# Patient Record
Sex: FEMALE | Race: WHITE | NOT HISPANIC OR LATINO | Employment: OTHER | URBAN - METROPOLITAN AREA
[De-identification: names, ages, dates, MRNs, and addresses within clinical notes are randomized per-mention and may not be internally consistent; named-entity substitution may affect disease eponyms.]

---

## 2023-10-09 PROBLEM — J45.909 ASTHMA (HHS-HCC): Status: ACTIVE | Noted: 2023-10-09

## 2023-10-09 PROBLEM — M81.0 OSTEOPOROSIS OF VERTEBRA: Status: ACTIVE | Noted: 2023-10-09

## 2023-10-09 PROBLEM — M48.02 CERVICAL STENOSIS OF SPINE: Status: ACTIVE | Noted: 2023-10-09

## 2023-10-09 PROBLEM — M47.812 SPONDYLOSIS OF CERVICAL REGION WITHOUT MYELOPATHY OR RADICULOPATHY: Status: ACTIVE | Noted: 2023-10-09

## 2023-10-09 PROBLEM — M51.369 LUMBAR DEGENERATIVE DISC DISEASE: Status: ACTIVE | Noted: 2023-10-09

## 2023-10-09 PROBLEM — M50.20 HERNIATION OF INTERVERTEBRAL DISC OF CERVICAL REGION: Status: ACTIVE | Noted: 2023-10-09

## 2023-10-09 PROBLEM — M54.50 LOW BACK PAIN: Status: ACTIVE | Noted: 2023-10-09

## 2023-10-09 PROBLEM — M51.36 LUMBAR DEGENERATIVE DISC DISEASE: Status: ACTIVE | Noted: 2023-10-09

## 2023-10-09 PROBLEM — M54.40 LUMBAGO WITH SCIATICA: Status: ACTIVE | Noted: 2023-10-09

## 2023-10-09 PROBLEM — M48.061 LUMBAR CANAL STENOSIS: Status: ACTIVE | Noted: 2023-10-09

## 2023-10-09 PROBLEM — M54.2 CERVICAL PAIN: Status: ACTIVE | Noted: 2023-10-09

## 2023-10-09 PROBLEM — M54.17 LUMBOSACRAL NEURITIS: Status: ACTIVE | Noted: 2023-10-09

## 2023-10-09 PROBLEM — S32.040K: Status: ACTIVE | Noted: 2023-10-09

## 2023-10-09 PROBLEM — G56.00 CARPAL TUNNEL SYNDROME: Status: ACTIVE | Noted: 2023-10-09

## 2023-10-09 PROBLEM — M54.12 CERVICAL RADICULOPATHY: Status: ACTIVE | Noted: 2023-10-09

## 2023-10-09 PROBLEM — M47.816 LUMBAR ARTHROPATHY: Status: ACTIVE | Noted: 2023-10-09

## 2023-10-09 RX ORDER — INSULIN ASPART 100 [IU]/ML
INJECTION, SOLUTION INTRAVENOUS; SUBCUTANEOUS
COMMUNITY

## 2023-10-09 RX ORDER — MAGNESIUM OXIDE 240 MG
POWDER IN PACKET (EA) ORAL
COMMUNITY

## 2023-10-09 RX ORDER — GABAPENTIN 600 MG/1
1200 TABLET ORAL 3 TIMES DAILY
COMMUNITY
Start: 2014-02-05

## 2023-10-09 RX ORDER — METHADONE HYDROCHLORIDE 5 MG/1
1 TABLET ORAL 2 TIMES DAILY
COMMUNITY
Start: 2016-07-01

## 2023-10-09 RX ORDER — CHOLECALCIFEROL (VITAMIN D3)
CRYSTALS MISCELLANEOUS
COMMUNITY

## 2023-10-09 RX ORDER — INSULIN GLARGINE 100 [IU]/ML
INJECTION, SOLUTION SUBCUTANEOUS
COMMUNITY
Start: 2013-10-07

## 2023-10-09 RX ORDER — OXYCODONE AND ACETAMINOPHEN 5; 325 MG/1; MG/1
1 TABLET ORAL EVERY 6 HOURS PRN
COMMUNITY

## 2023-10-09 RX ORDER — DULOXETIN HYDROCHLORIDE 60 MG/1
60 CAPSULE, DELAYED RELEASE ORAL
COMMUNITY

## 2023-10-09 RX ORDER — TIZANIDINE HYDROCHLORIDE 4 MG/1
1 CAPSULE, GELATIN COATED ORAL NIGHTLY
COMMUNITY

## 2023-10-09 RX ORDER — ALBUTEROL SULFATE 0.83 MG/ML
SOLUTION RESPIRATORY (INHALATION)
COMMUNITY

## 2023-10-09 RX ORDER — METOPROLOL TARTRATE 25 MG/1
1 TABLET, FILM COATED ORAL DAILY
COMMUNITY
Start: 2013-04-01

## 2023-10-09 RX ORDER — POLYETHYLENE GLYCOL 3350 17 G/17G
POWDER, FOR SOLUTION ORAL
COMMUNITY

## 2023-10-09 RX ORDER — INSULIN LISPRO 100 [IU]/ML
INJECTION, SOLUTION INTRAVENOUS; SUBCUTANEOUS
COMMUNITY
Start: 2013-06-22

## 2023-10-09 RX ORDER — SUCRALFATE 1 G/1
1 TABLET ORAL DAILY
COMMUNITY

## 2023-10-09 RX ORDER — UBIDECARENONE 75 MG
500 CAPSULE ORAL DAILY
COMMUNITY

## 2023-10-09 RX ORDER — BISACODYL 5 MG
1 TABLET, DELAYED RELEASE (ENTERIC COATED) ORAL 2 TIMES DAILY
COMMUNITY

## 2023-10-09 RX ORDER — CELECOXIB 200 MG/1
CAPSULE ORAL
COMMUNITY

## 2023-10-09 RX ORDER — NAPROXEN SODIUM 220 MG/1
TABLET, FILM COATED ORAL
COMMUNITY

## 2023-10-09 RX ORDER — MICONAZOLE NITRATE
POWDER (GRAM) MISCELLANEOUS
COMMUNITY

## 2023-10-09 RX ORDER — FUROSEMIDE 40 MG/1
1 TABLET ORAL DAILY
COMMUNITY

## 2023-10-09 RX ORDER — OMEPRAZOLE 20 MG/1
1 CAPSULE, DELAYED RELEASE ORAL 2 TIMES DAILY
COMMUNITY

## 2023-10-09 RX ORDER — ATORVASTATIN CALCIUM 20 MG/1
1 TABLET, FILM COATED ORAL NIGHTLY
COMMUNITY

## 2023-10-09 RX ORDER — PREGABALIN 150 MG/1
1 CAPSULE ORAL 3 TIMES DAILY
COMMUNITY
Start: 2020-05-14

## 2023-10-09 RX ORDER — MOMETASONE FUROATE AND FORMOTEROL FUMARATE DIHYDRATE 100; 5 UG/1; UG/1
AEROSOL RESPIRATORY (INHALATION)
COMMUNITY

## 2023-10-10 ENCOUNTER — APPOINTMENT (OUTPATIENT)
Dept: PAIN MEDICINE | Facility: CLINIC | Age: 63
End: 2023-10-10
Payer: MEDICAID

## 2023-12-11 ENCOUNTER — HOSPITAL ENCOUNTER (OUTPATIENT)
Dept: RESPIRATORY THERAPY | Facility: HOSPITAL | Age: 63
Discharge: HOME | End: 2023-12-11
Payer: MEDICAID

## 2023-12-11 DIAGNOSIS — J44.9 CHRONIC OBSTRUCTIVE PULMONARY DISEASE, UNSPECIFIED COPD TYPE (MULTI): ICD-10-CM

## 2023-12-11 PROCEDURE — 94729 DIFFUSING CAPACITY: CPT

## 2023-12-13 LAB
MGC ASCENT PFT - FEV1 - POST: 1.54
MGC ASCENT PFT - FEV1 - PRE: 1.39
MGC ASCENT PFT - FEV1 - PREDICTED: 2.28
MGC ASCENT PFT - FVC - POST: 1.95
MGC ASCENT PFT - FVC - PRE: 1.79
MGC ASCENT PFT - FVC - PREDICTED: 2.88

## 2024-02-01 ENCOUNTER — APPOINTMENT (OUTPATIENT)
Dept: RADIOLOGY | Facility: HOSPITAL | Age: 64
End: 2024-02-01
Payer: MEDICAID

## 2024-02-07 ENCOUNTER — HOSPITAL ENCOUNTER (OUTPATIENT)
Dept: RADIOLOGY | Facility: CLINIC | Age: 64
Discharge: HOME | End: 2024-02-07
Payer: MEDICAID

## 2024-02-07 DIAGNOSIS — E04.2 NONTOXIC MULTINODULAR GOITER: ICD-10-CM

## 2024-02-07 DIAGNOSIS — R93.89 ABNORMAL FINDINGS ON DIAGNOSTIC IMAGING OF OTHER SPECIFIED BODY STRUCTURES: ICD-10-CM

## 2024-02-07 PROCEDURE — 76536 US EXAM OF HEAD AND NECK: CPT | Performed by: RADIOLOGY

## 2024-02-07 PROCEDURE — 76536 US EXAM OF HEAD AND NECK: CPT

## 2024-02-20 ENCOUNTER — CLINICAL SUPPORT (OUTPATIENT)
Dept: SLEEP MEDICINE | Facility: CLINIC | Age: 64
End: 2024-02-20
Payer: MEDICAID

## 2024-02-20 DIAGNOSIS — G47.33 OBSTRUCTIVE SLEEP APNEA (ADULT) (PEDIATRIC): ICD-10-CM

## 2024-02-20 PROCEDURE — 95810 POLYSOM 6/> YRS 4/> PARAM: CPT | Performed by: PSYCHIATRY & NEUROLOGY

## 2024-02-21 VITALS
WEIGHT: 279.98 LBS | SYSTOLIC BLOOD PRESSURE: 130 MMHG | HEIGHT: 64 IN | BODY MASS INDEX: 47.8 KG/M2 | DIASTOLIC BLOOD PRESSURE: 61 MMHG

## 2024-02-21 NOTE — PROGRESS NOTES
[facility] TECH NOTE:     Patient: Guillermina Bonilla   MRN//AGE: [id number]  1960  63 y.o.   Technologist: Lara Parekh   Room: 4   Service Date: 2024        Sleep Testing Location: [place]    [place]: not completed on paperwork    TECHNOLOGIST SLEEP STUDY PROCEDURE NOTE:   This sleep study is being conducted according to the policies and procedures outlined by the A[employer] accreditation standards.  The sleep study procedure and processes involved during this appointment was explained to the patient/patient’s family, questions were answered. The patient/family verbalized understanding.      The patient is a 63 y.o. year old female scheduled for aDiagnostic PSG Split night with montage of:  SPLIT . she arrived for her appointment.      The study that was ultimately completed was a Diagnostic PSG  with montage of:  SPLIT .    The full study Was completed.  Patient questionnaires completed?: yes -partially    Consents signed? yes    Initial Fall Risk Screening:     Guillermina has fallen in the last 6 months. her did result in injury. Guillermina does have a fear of falling. He does need assistance with sitting, standing, or walking. she does need assistance walking in her home. she does need assistance in an unfamiliar setting. The patient isusing an assistive device.     Brief Study observations: The patient is a 63 year old female here for a SPLIT study. This is the patient's 3rd study according to patient- never diagnosed with YADIRA. The patient arrived with her  at  and completed SOME paperwork with help. NO EPWORTH completed. Patient arrived with no bed clothes. Patient is incontinent and wears depends- did not bring any with her ( lab provided some). Bedtime and wake time not listed in paperwork. Patient sleeps in side position at home.   Patient is on 4LPM Oxygen . Started study without O2.  Medicaid-4% rule  The sleep study procedure and process involved during this appointment was explained to the patient  and questions were answered. The patient verbalized understanding.     Deviation to order/protocol and reason: none        Other:None    After the procedure, the patient/family was informed to ensure followup with ordering clinician for testing results.      Technologist: Lara Parekh

## 2024-09-10 ENCOUNTER — APPOINTMENT (OUTPATIENT)
Dept: ORTHOPEDIC SURGERY | Facility: CLINIC | Age: 64
End: 2024-09-10
Payer: MEDICARE

## 2024-09-10 VITALS — WEIGHT: 279 LBS | BODY MASS INDEX: 47.63 KG/M2 | HEIGHT: 64 IN

## 2024-09-10 DIAGNOSIS — M47.816 LUMBAR SPONDYLOSIS: Primary | ICD-10-CM

## 2024-09-10 PROCEDURE — 3008F BODY MASS INDEX DOCD: CPT | Performed by: FAMILY MEDICINE

## 2024-09-10 PROCEDURE — 99203 OFFICE O/P NEW LOW 30 MIN: CPT | Performed by: FAMILY MEDICINE

## 2024-09-10 NOTE — PROGRESS NOTES
History of Present Illness   Chief Complaint   Patient presents with    Lower Back - Pain     NKI  +lumbar pain x several years  +sciatica left side  H/O injections and some therapy with pain mgmt         The patient is 63 y.o. female  here with a complaint of chronic right back pain.  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  Patient has had history of chronic back pain, believes last time she saw someone specifically for her back was around 5 years ago, says that she has had injections in the past with minimal benefit, admits to being on higher doses of narcotic pain medication in the past, was eventually weaned off of these meds, she is currently taking Motrin which is of no benefit, says she has tried tramadol in the past which was of no benefit.  Pain across the low back, slightly worse on the left, she denies any significant radiation of pain.  Patient is minimally ambulatory, can walk around her room at her nursing facility but uses a wheelchair for any extended distances, says it hurts her back to stand and ambulate.  She says that her nurse recommended that she follow-up with me for evaluation today.  She has had some recent imaging including CT scan in February of this year that showed moderate degenerative changes of the lumbar spine.  She has tried physical therapy in the past including at her current nursing home but says that pain is too significant to do PT    Past Medical History:   Diagnosis Date    Anesthesia of skin     Numbness    Cervicalgia 02/19/2021    Cervical pain    Difficulty in walking, not elsewhere classified     Difficulty walking    Other symptoms and signs involving the musculoskeletal system     Limb weakness    Pain in unspecified limb     Limb pain    Personal history of other mental and behavioral disorders      History of depression    Personal history of other specified conditions     History of fatigue    Personal history of other specified conditions     History of urinary frequency    Personal history of other specified conditions     History of pain when walking    Spinal stenosis, cervical region 04/25/2017    Cervical stenosis of spine    Spondylosis without myelopathy or radiculopathy, cervical region 04/25/2017    Spondylosis of cervical region without myelopathy or radiculopathy    Xerosis cutis     Dry skin       Medication Documentation Review Audit    **Prior to Admission medications have not yet been reviewed**         Allergies   Allergen Reactions    Azithromycin Nausea Only    Erythromycin Base Nausea Only    Tetracyclines Rash       Social History     Socioeconomic History    Marital status:      Spouse name: Not on file    Number of children: Not on file    Years of education: Not on file    Highest education level: Not on file   Occupational History    Not on file   Tobacco Use    Smoking status: Not on file    Smokeless tobacco: Not on file   Substance and Sexual Activity    Alcohol use: Not Currently    Drug use: Never    Sexual activity: Not on file   Other Topics Concern    Not on file   Social History Narrative    Not on file     Social Determinants of Health     Financial Resource Strain: Low Risk  (5/31/2022)    Received from [facility]    Overall Financial Resource Strain (CARDIA)     Difficulty of Paying Living Expenses: Not hard at all   Food Insecurity: No Food Insecurity (5/31/2022)    Received from [employer]    Hunger Vital Sign     Worried About Running Out of Food in the Last Year: Never true     Ran Out of Food in the Last Year: Never true   Transportation Needs: No Transportation Needs (5/31/2022)    Received from [employer]    PRAPARE - Transportation     Lack of Transportation (Medical): No     Lack of Transportation (Non-Medical): No    Physical Activity: Inactive (2022)    Received from [facility]    Exercise Vital Sign     Days of Exercise per Week: 0 days     Minutes of Exercise per Session: 0 min   Stress: Not on File (2019)    [facility] from [facility]    Stress     Stress: 0   Social Connections: Not on File (2019)    Received from [facility]    Social Connections     Social Connections and Isolation: 0   Intimate Partner Violence: Not on file   Housing Stability: Low Risk  (2022)    Received from [facility]    Housing Stability Vital Sign     Unable to Pay for Housing in the Last Year: No     Number of Places Lived in the Last Year: 1     Unstable Housing in the Last Year: No       Past Surgical History:   Procedure Laterality Date    CERVICAL FUSION  2014    Cervical Vertebral Fusion     SECTION, CLASSIC  2014     Section    OTHER SURGICAL HISTORY  2020    Ankle surgery    OTHER SURGICAL HISTORY  2020    Coronary artery bypass graft          Review of Systems   GENERAL: Negative  GI: Negative  MUSCULOSKELETAL: See HPI  SKIN: Negative  NEURO:  Negative     Physical Exam:    General/Constitutional: well appearing, no distress, appears stated age  HEENT: sclera clear  Respiratory: non labored breathing  Vascular: No edema, swelling or tenderness, except as noted in detailed exam.  Integumentary: No impressive skin lesions present, except as noted in detailed exam.  Neurological:  Alert and oriented   Psychological:  Normal mood and affect.  Musculoskeletal: Normal, except as noted in detailed exam and in HPI.  In wheelchair, able to stand but unable to ambulate any distance in the room unassisted    Lumbar spine: There is some generalized tenderness palpation in the lumbar spine including midline and bilateral lumbar paraspinal muscles.  She has limited range of motion, forward flexion of a few degrees, extension to neutral, she is limited to bilateral  twisting and sidebending secondary to pain as well as balance.  No gross lower extremity motor or sensory deficits are present, no pathologic lower extremity flexes, negative clonus bilaterally.       Imaging: X-ray report of the lumbar spine and CT scanned report of lumbar spine were available for review from February 2024 as well as new x-ray from this month, both showed some moderate diffuse degenerative changes of the lumbar spine      Assessment   1. Lumbar spondylosis              Plan: Chronic low back pain secondary to lumbar spondylosis/degenerative disc disease.  Patient is quite deconditioned and obese which is likely contributing to her symptoms.  Explained to patient that I have little to offer from a sports medicine perspective, may benefit from another evaluation through pain management to determine if repeat injections would be of benefit, other treatment modalities for her.  We discussed that weight loss would be of benefit for her overall symptom presentation.  Patient voiced understanding.  She was given the names of some pain management providers to reach out to.  She will follow-up as needed.

## 2024-09-19 ENCOUNTER — TELEPHONE (OUTPATIENT)
Dept: PAIN MEDICINE | Facility: CLINIC | Age: 64
End: 2024-09-19
Payer: MEDICARE

## 2024-09-22 ENCOUNTER — APPOINTMENT (OUTPATIENT)
Dept: CARDIOLOGY | Facility: HOSPITAL | Age: 64
DRG: 280 | End: 2024-09-22
Payer: MEDICARE

## 2024-09-22 ENCOUNTER — APPOINTMENT (OUTPATIENT)
Dept: RADIOLOGY | Facility: HOSPITAL | Age: 64
DRG: 280 | End: 2024-09-22
Payer: MEDICARE

## 2024-09-22 ENCOUNTER — HOSPITAL ENCOUNTER (INPATIENT)
Facility: HOSPITAL | Age: 64
DRG: 280 | End: 2024-09-22
Attending: EMERGENCY MEDICINE | Admitting: ANESTHESIOLOGY
Payer: MEDICARE
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
## 2024-09-22 DIAGNOSIS — R79.89 ELEVATED TROPONIN: ICD-10-CM

## 2024-09-22 DIAGNOSIS — J96.02 ACUTE HYPERCAPNIC RESPIRATORY FAILURE (MULTI): Primary | ICD-10-CM

## 2024-09-22 DIAGNOSIS — I50.9 ACUTE HEART FAILURE, UNSPECIFIED HEART FAILURE TYPE: ICD-10-CM

## 2024-09-22 DIAGNOSIS — N17.9 AKI (ACUTE KIDNEY INJURY) (CMS-HCC): ICD-10-CM

## 2024-09-22 LAB
ABO GROUP (TYPE) IN BLOOD: NORMAL
ALBUMIN SERPL BCP-MCNC: 3.8 G/DL (ref 3.4–5)
ALP SERPL-CCNC: 67 U/L (ref 33–136)
ALT SERPL W P-5'-P-CCNC: 14 U/L (ref 7–45)
ANION GAP BLDV CALCULATED.4IONS-SCNC: 6 MMOL/L (ref 10–25)
ANION GAP BLDV CALCULATED.4IONS-SCNC: 6 MMOL/L (ref 10–25)
ANION GAP BLDV CALCULATED.4IONS-SCNC: 7 MMOL/L (ref 10–25)
ANION GAP SERPL CALC-SCNC: 12 MMOL/L (ref 10–20)
ANTIBODY SCREEN: NORMAL
APAP SERPL-MCNC: <10 UG/ML
APPEARANCE UR: CLEAR
AST SERPL W P-5'-P-CCNC: 27 U/L (ref 9–39)
BASE EXCESS BLDV CALC-SCNC: 1.4 MMOL/L (ref -2–3)
BASE EXCESS BLDV CALC-SCNC: 3.2 MMOL/L (ref -2–3)
BASE EXCESS BLDV CALC-SCNC: 3.5 MMOL/L (ref -2–3)
BASOPHILS # BLD AUTO: 0.03 X10*3/UL (ref 0–0.1)
BASOPHILS NFR BLD AUTO: 0.2 %
BILIRUB SERPL-MCNC: 0.3 MG/DL (ref 0–1.2)
BILIRUB UR STRIP.AUTO-MCNC: NEGATIVE MG/DL
BNP SERPL-MCNC: 566 PG/ML (ref 0–99)
BODY TEMPERATURE: 37 DEGREES CELSIUS
BUN SERPL-MCNC: 18 MG/DL (ref 6–23)
CA-I BLDV-SCNC: 1.15 MMOL/L (ref 1.1–1.33)
CA-I BLDV-SCNC: 1.22 MMOL/L (ref 1.1–1.33)
CA-I BLDV-SCNC: 1.22 MMOL/L (ref 1.1–1.33)
CALCIUM SERPL-MCNC: 8.6 MG/DL (ref 8.6–10.3)
CARDIAC TROPONIN I PNL SERPL HS: 343 NG/L (ref 0–13)
CARDIAC TROPONIN I PNL SERPL HS: 402 NG/L (ref 0–13)
CHLORIDE BLDV-SCNC: 102 MMOL/L (ref 98–107)
CHLORIDE BLDV-SCNC: 102 MMOL/L (ref 98–107)
CHLORIDE BLDV-SCNC: 103 MMOL/L (ref 98–107)
CHLORIDE SERPL-SCNC: 103 MMOL/L (ref 98–107)
CO2 SERPL-SCNC: 30 MMOL/L (ref 21–32)
COLOR UR: YELLOW
CREAT SERPL-MCNC: 1.08 MG/DL (ref 0.5–1.05)
CRITICAL CALL TIME: 1745
CRITICAL CALL TIME: 1905
CRITICAL CALLED BY: ABNORMAL
CRITICAL CALLED BY: ABNORMAL
CRITICAL CALLED TO: ABNORMAL
CRITICAL CALLED TO: ABNORMAL
CRITICAL READ BACK: ABNORMAL
CRITICAL READ BACK: ABNORMAL
EGFRCR SERPLBLD CKD-EPI 2021: 57 ML/MIN/1.73M*2
EOSINOPHIL # BLD AUTO: 0.01 X10*3/UL (ref 0–0.7)
EOSINOPHIL NFR BLD AUTO: 0.1 %
ERYTHROCYTE [DISTWIDTH] IN BLOOD BY AUTOMATED COUNT: 13.2 % (ref 11.5–14.5)
ETHANOL SERPL-MCNC: <10 MG/DL
GLUCOSE BLD MANUAL STRIP-MCNC: 211 MG/DL (ref 74–99)
GLUCOSE BLDV-MCNC: 186 MG/DL (ref 74–99)
GLUCOSE BLDV-MCNC: 191 MG/DL (ref 74–99)
GLUCOSE BLDV-MCNC: 241 MG/DL (ref 74–99)
GLUCOSE SERPL-MCNC: 190 MG/DL (ref 74–99)
GLUCOSE UR STRIP.AUTO-MCNC: ABNORMAL MG/DL
HCO3 BLDV-SCNC: 30.1 MMOL/L (ref 22–26)
HCO3 BLDV-SCNC: 33.6 MMOL/L (ref 22–26)
HCO3 BLDV-SCNC: 34.7 MMOL/L (ref 22–26)
HCT VFR BLD AUTO: 40.1 % (ref 36–46)
HCT VFR BLD EST: 36 % (ref 36–46)
HCT VFR BLD EST: 38 % (ref 36–46)
HCT VFR BLD EST: 38 % (ref 36–46)
HGB BLD-MCNC: 12.6 G/DL (ref 12–16)
HGB BLDV-MCNC: 12 G/DL (ref 12–16)
HGB BLDV-MCNC: 12.6 G/DL (ref 12–16)
HGB BLDV-MCNC: 12.7 G/DL (ref 12–16)
HYALINE CASTS #/AREA URNS AUTO: ABNORMAL /LPF
IMM GRANULOCYTES # BLD AUTO: 0.14 X10*3/UL (ref 0–0.7)
IMM GRANULOCYTES NFR BLD AUTO: 1 % (ref 0–0.9)
INHALED O2 CONCENTRATION: 100 %
INHALED O2 CONCENTRATION: 70 %
INHALED O2 CONCENTRATION: 70 %
INR PPP: 1 (ref 0.9–1.1)
KETONES UR STRIP.AUTO-MCNC: NEGATIVE MG/DL
LACTATE BLDV-SCNC: 1.5 MMOL/L (ref 0.4–2)
LACTATE BLDV-SCNC: 1.7 MMOL/L (ref 0.4–2)
LACTATE BLDV-SCNC: 1.7 MMOL/L (ref 0.4–2)
LEUKOCYTE ESTERASE UR QL STRIP.AUTO: NEGATIVE
LYMPHOCYTES # BLD AUTO: 1.83 X10*3/UL (ref 1.2–4.8)
LYMPHOCYTES NFR BLD AUTO: 12.9 %
MAGNESIUM SERPL-MCNC: 2.19 MG/DL (ref 1.6–2.4)
MCH RBC QN AUTO: 30.8 PG (ref 26–34)
MCHC RBC AUTO-ENTMCNC: 31.4 G/DL (ref 32–36)
MCV RBC AUTO: 98 FL (ref 80–100)
MONOCYTES # BLD AUTO: 0.82 X10*3/UL (ref 0.1–1)
MONOCYTES NFR BLD AUTO: 5.8 %
MUCOUS THREADS #/AREA URNS AUTO: ABNORMAL /LPF
NEUTROPHILS # BLD AUTO: 11.32 X10*3/UL (ref 1.2–7.7)
NEUTROPHILS NFR BLD AUTO: 80 %
NITRITE UR QL STRIP.AUTO: NEGATIVE
NRBC BLD-RTO: 0.1 /100 WBCS (ref 0–0)
OXYHGB MFR BLDV: 54.8 % (ref 45–75)
OXYHGB MFR BLDV: 77.9 % (ref 45–75)
OXYHGB MFR BLDV: 91.9 % (ref 45–75)
PCO2 BLDV: 67 MM HG (ref 41–51)
PCO2 BLDV: 86 MM HG (ref 41–51)
PCO2 BLDV: 93 MM HG (ref 41–51)
PH BLDV: 7.18 PH (ref 7.33–7.43)
PH BLDV: 7.2 PH (ref 7.33–7.43)
PH BLDV: 7.26 PH (ref 7.33–7.43)
PH UR STRIP.AUTO: 6 [PH]
PHOSPHATE SERPL-MCNC: 6.4 MG/DL (ref 2.5–4.9)
PLATELET # BLD AUTO: 133 X10*3/UL (ref 150–450)
PO2 BLDV: 37 MM HG (ref 35–45)
PO2 BLDV: 55 MM HG (ref 35–45)
PO2 BLDV: 70 MM HG (ref 35–45)
POTASSIUM BLDV-SCNC: 4.6 MMOL/L (ref 3.5–5.3)
POTASSIUM BLDV-SCNC: 4.7 MMOL/L (ref 3.5–5.3)
POTASSIUM BLDV-SCNC: 4.9 MMOL/L (ref 3.5–5.3)
POTASSIUM SERPL-SCNC: 5.1 MMOL/L (ref 3.5–5.3)
PROT SERPL-MCNC: 6.6 G/DL (ref 6.4–8.2)
PROT UR STRIP.AUTO-MCNC: ABNORMAL MG/DL
PROTHROMBIN TIME: 11.8 SECONDS (ref 9.8–12.8)
RBC # BLD AUTO: 4.09 X10*6/UL (ref 4–5.2)
RBC # UR STRIP.AUTO: ABNORMAL /UL
RBC #/AREA URNS AUTO: ABNORMAL /HPF
RH FACTOR (ANTIGEN D): NORMAL
SALICYLATES SERPL-MCNC: <3 MG/DL
SAO2 % BLDV: 57 % (ref 45–75)
SAO2 % BLDV: 82 % (ref 45–75)
SAO2 % BLDV: 95 % (ref 45–75)
SODIUM BLDV-SCNC: 134 MMOL/L (ref 136–145)
SODIUM BLDV-SCNC: 137 MMOL/L (ref 136–145)
SODIUM BLDV-SCNC: 139 MMOL/L (ref 136–145)
SODIUM SERPL-SCNC: 140 MMOL/L (ref 136–145)
SP GR UR STRIP.AUTO: 1.03
UROBILINOGEN UR STRIP.AUTO-MCNC: NORMAL MG/DL
WBC # BLD AUTO: 14.2 X10*3/UL (ref 4.4–11.3)
WBC #/AREA URNS AUTO: ABNORMAL /HPF

## 2024-09-22 PROCEDURE — 71275 CT ANGIOGRAPHY CHEST: CPT | Performed by: RADIOLOGY

## 2024-09-22 PROCEDURE — 84132 ASSAY OF SERUM POTASSIUM: CPT

## 2024-09-22 PROCEDURE — 2500000004 HC RX 250 GENERAL PHARMACY W/ HCPCS (ALT 636 FOR OP/ED): Performed by: ANESTHESIOLOGY

## 2024-09-22 PROCEDURE — 96367 TX/PROPH/DG ADDL SEQ IV INF: CPT | Mod: 59

## 2024-09-22 PROCEDURE — 93005 ELECTROCARDIOGRAM TRACING: CPT

## 2024-09-22 PROCEDURE — 96368 THER/DIAG CONCURRENT INF: CPT | Mod: 59

## 2024-09-22 PROCEDURE — 82947 ASSAY GLUCOSE BLOOD QUANT: CPT

## 2024-09-22 PROCEDURE — P9612 CATHETERIZE FOR URINE SPEC: HCPCS

## 2024-09-22 PROCEDURE — 70486 CT MAXILLOFACIAL W/O DYE: CPT

## 2024-09-22 PROCEDURE — 5A09357 ASSISTANCE WITH RESPIRATORY VENTILATION, LESS THAN 24 CONSECUTIVE HOURS, CONTINUOUS POSITIVE AIRWAY PRESSURE: ICD-10-PCS | Performed by: INTERNAL MEDICINE

## 2024-09-22 PROCEDURE — 87899 AGENT NOS ASSAY W/OPTIC: CPT | Mod: PARLAB

## 2024-09-22 PROCEDURE — 2500000002 HC RX 250 W HCPCS SELF ADMINISTERED DRUGS (ALT 637 FOR MEDICARE OP, ALT 636 FOR OP/ED)

## 2024-09-22 PROCEDURE — 96375 TX/PRO/DX INJ NEW DRUG ADDON: CPT | Mod: 59

## 2024-09-22 PROCEDURE — 51702 INSERT TEMP BLADDER CATH: CPT

## 2024-09-22 PROCEDURE — 70450 CT HEAD/BRAIN W/O DYE: CPT

## 2024-09-22 PROCEDURE — 84100 ASSAY OF PHOSPHORUS: CPT

## 2024-09-22 PROCEDURE — 84484 ASSAY OF TROPONIN QUANT: CPT | Performed by: ANESTHESIOLOGY

## 2024-09-22 PROCEDURE — 2500000002 HC RX 250 W HCPCS SELF ADMINISTERED DRUGS (ALT 637 FOR MEDICARE OP, ALT 636 FOR OP/ED): Performed by: ANESTHESIOLOGY

## 2024-09-22 PROCEDURE — 83735 ASSAY OF MAGNESIUM: CPT

## 2024-09-22 PROCEDURE — 84132 ASSAY OF SERUM POTASSIUM: CPT | Performed by: EMERGENCY MEDICINE

## 2024-09-22 PROCEDURE — 94640 AIRWAY INHALATION TREATMENT: CPT

## 2024-09-22 PROCEDURE — G0390 TRAUMA RESPONS W/HOSP CRITI: HCPCS

## 2024-09-22 PROCEDURE — 71275 CT ANGIOGRAPHY CHEST: CPT

## 2024-09-22 PROCEDURE — 84484 ASSAY OF TROPONIN QUANT: CPT

## 2024-09-22 PROCEDURE — 72131 CT LUMBAR SPINE W/O DYE: CPT | Mod: RCN

## 2024-09-22 PROCEDURE — 71045 X-RAY EXAM CHEST 1 VIEW: CPT

## 2024-09-22 PROCEDURE — 71045 X-RAY EXAM CHEST 1 VIEW: CPT | Performed by: RADIOLOGY

## 2024-09-22 PROCEDURE — 76377 3D RENDER W/INTRP POSTPROCES: CPT | Mod: CCI

## 2024-09-22 PROCEDURE — 2020000001 HC ICU ROOM DAILY

## 2024-09-22 PROCEDURE — 72170 X-RAY EXAM OF PELVIS: CPT

## 2024-09-22 PROCEDURE — 99291 CRITICAL CARE FIRST HOUR: CPT | Performed by: ANESTHESIOLOGY

## 2024-09-22 PROCEDURE — 36415 COLL VENOUS BLD VENIPUNCTURE: CPT

## 2024-09-22 PROCEDURE — 72131 CT LUMBAR SPINE W/O DYE: CPT | Performed by: RADIOLOGY

## 2024-09-22 PROCEDURE — 80320 DRUG SCREEN QUANTALCOHOLS: CPT

## 2024-09-22 PROCEDURE — 81001 URINALYSIS AUTO W/SCOPE: CPT

## 2024-09-22 PROCEDURE — 74177 CT ABD & PELVIS W/CONTRAST: CPT | Performed by: RADIOLOGY

## 2024-09-22 PROCEDURE — 2550000001 HC RX 255 CONTRASTS: Performed by: EMERGENCY MEDICINE

## 2024-09-22 PROCEDURE — 72128 CT CHEST SPINE W/O DYE: CPT | Performed by: RADIOLOGY

## 2024-09-22 PROCEDURE — 72170 X-RAY EXAM OF PELVIS: CPT | Performed by: RADIOLOGY

## 2024-09-22 PROCEDURE — 99291 CRITICAL CARE FIRST HOUR: CPT | Performed by: EMERGENCY MEDICINE

## 2024-09-22 PROCEDURE — 87040 BLOOD CULTURE FOR BACTERIA: CPT | Mod: PARLAB

## 2024-09-22 PROCEDURE — 96365 THER/PROPH/DIAG IV INF INIT: CPT | Mod: 59

## 2024-09-22 PROCEDURE — 72125 CT NECK SPINE W/O DYE: CPT

## 2024-09-22 PROCEDURE — 2500000004 HC RX 250 GENERAL PHARMACY W/ HCPCS (ALT 636 FOR OP/ED)

## 2024-09-22 PROCEDURE — 85610 PROTHROMBIN TIME: CPT

## 2024-09-22 PROCEDURE — 86901 BLOOD TYPING SEROLOGIC RH(D): CPT

## 2024-09-22 PROCEDURE — 72128 CT CHEST SPINE W/O DYE: CPT | Mod: RCN

## 2024-09-22 PROCEDURE — 94660 CPAP INITIATION&MGMT: CPT

## 2024-09-22 PROCEDURE — 94799 UNLISTED PULMONARY SVC/PX: CPT

## 2024-09-22 PROCEDURE — 74177 CT ABD & PELVIS W/CONTRAST: CPT

## 2024-09-22 PROCEDURE — 83880 ASSAY OF NATRIURETIC PEPTIDE: CPT

## 2024-09-22 PROCEDURE — 2500000001 HC RX 250 WO HCPCS SELF ADMINISTERED DRUGS (ALT 637 FOR MEDICARE OP): Performed by: EMERGENCY MEDICINE

## 2024-09-22 PROCEDURE — 85025 COMPLETE CBC W/AUTO DIFF WBC: CPT

## 2024-09-22 PROCEDURE — 99285 EMERGENCY DEPT VISIT HI MDM: CPT | Mod: 25

## 2024-09-22 RX ORDER — TRAZODONE HYDROCHLORIDE 150 MG/1
150 TABLET ORAL NIGHTLY
COMMUNITY

## 2024-09-22 RX ORDER — MAGNESIUM SULFATE HEPTAHYDRATE 40 MG/ML
2 INJECTION, SOLUTION INTRAVENOUS ONCE
Status: COMPLETED | OUTPATIENT
Start: 2024-09-22 | End: 2024-09-22

## 2024-09-22 RX ORDER — GABAPENTIN 300 MG/1
600 CAPSULE ORAL 3 TIMES DAILY
COMMUNITY

## 2024-09-22 RX ORDER — ONDANSETRON 4 MG/1
4 TABLET, FILM COATED ORAL EVERY 12 HOURS PRN
COMMUNITY

## 2024-09-22 RX ORDER — ADHESIVE BANDAGE
30 BANDAGE TOPICAL DAILY PRN
COMMUNITY

## 2024-09-22 RX ORDER — BISACODYL 10 MG/1
10 SUPPOSITORY RECTAL DAILY PRN
COMMUNITY

## 2024-09-22 RX ORDER — IPRATROPIUM BROMIDE AND ALBUTEROL SULFATE 2.5; .5 MG/3ML; MG/3ML
3 SOLUTION RESPIRATORY (INHALATION)
Status: DISCONTINUED | OUTPATIENT
Start: 2024-09-23 | End: 2024-09-22

## 2024-09-22 RX ORDER — OXYBUTYNIN CHLORIDE 15 MG/1
15 TABLET, EXTENDED RELEASE ORAL DAILY
COMMUNITY

## 2024-09-22 RX ORDER — DEXTROSE 50 % IN WATER (D50W) INTRAVENOUS SYRINGE
25
Status: DISCONTINUED | OUTPATIENT
Start: 2024-09-22 | End: 2024-09-24 | Stop reason: HOSPADM

## 2024-09-22 RX ORDER — INSULIN GLARGINE-YFGN 100 [IU]/ML
90 INJECTION, SOLUTION SUBCUTANEOUS NIGHTLY
COMMUNITY

## 2024-09-22 RX ORDER — INSULIN LISPRO 100 [IU]/ML
0-5 INJECTION, SOLUTION INTRAVENOUS; SUBCUTANEOUS EVERY 6 HOURS
Status: DISCONTINUED | OUTPATIENT
Start: 2024-09-22 | End: 2024-09-24

## 2024-09-22 RX ORDER — TALC
1 POWDER (GRAM) TOPICAL NIGHTLY
COMMUNITY

## 2024-09-22 RX ORDER — DULOXETIN HYDROCHLORIDE 30 MG/1
30 CAPSULE, DELAYED RELEASE ORAL DAILY
COMMUNITY

## 2024-09-22 RX ORDER — FUROSEMIDE 10 MG/ML
20 INJECTION INTRAMUSCULAR; INTRAVENOUS ONCE
Status: COMPLETED | OUTPATIENT
Start: 2024-09-22 | End: 2024-09-22

## 2024-09-22 RX ORDER — CHOLECALCIFEROL (VITAMIN D3) 25 MCG
2000 TABLET ORAL NIGHTLY
COMMUNITY

## 2024-09-22 RX ORDER — IBUPROFEN 200 MG
600 TABLET ORAL EVERY 6 HOURS PRN
COMMUNITY

## 2024-09-22 RX ORDER — TIZANIDINE 2 MG/1
2 TABLET ORAL EVERY 8 HOURS PRN
COMMUNITY

## 2024-09-22 RX ORDER — INSULIN LISPRO 100 [IU]/ML
50 INJECTION, SOLUTION INTRAVENOUS; SUBCUTANEOUS 3 TIMES DAILY
COMMUNITY

## 2024-09-22 RX ORDER — ALBUTEROL SULFATE 90 UG/1
2 INHALANT RESPIRATORY (INHALATION) EVERY 6 HOURS PRN
COMMUNITY
End: 2024-09-24 | Stop reason: HOSPADM

## 2024-09-22 RX ORDER — FUROSEMIDE 10 MG/ML
40 INJECTION INTRAMUSCULAR; INTRAVENOUS ONCE
Status: COMPLETED | OUTPATIENT
Start: 2024-09-22 | End: 2024-09-22

## 2024-09-22 RX ORDER — CEFTRIAXONE 1 G/50ML
1 INJECTION, SOLUTION INTRAVENOUS ONCE
Status: COMPLETED | OUTPATIENT
Start: 2024-09-22 | End: 2024-09-22

## 2024-09-22 RX ORDER — IPRATROPIUM BROMIDE AND ALBUTEROL SULFATE 2.5; .5 MG/3ML; MG/3ML
3 SOLUTION RESPIRATORY (INHALATION) EVERY 20 MIN
Status: COMPLETED | OUTPATIENT
Start: 2024-09-22 | End: 2024-09-22

## 2024-09-22 RX ORDER — ACETAMINOPHEN 325 MG/1
650 TABLET ORAL EVERY 6 HOURS PRN
COMMUNITY

## 2024-09-22 RX ORDER — ALUMINUM HYDROXIDE, MAGNESIUM HYDROXIDE, AND SIMETHICONE 2400; 240; 2400 MG/30ML; MG/30ML; MG/30ML
30 SUSPENSION ORAL EVERY 6 HOURS PRN
COMMUNITY

## 2024-09-22 RX ORDER — LIDOCAINE 560 MG/1
2 PATCH PERCUTANEOUS; TOPICAL; TRANSDERMAL DAILY PRN
COMMUNITY

## 2024-09-22 RX ORDER — INSULIN LISPRO 100 [IU]/ML
0-14 INJECTION, SOLUTION INTRAVENOUS; SUBCUTANEOUS 3 TIMES DAILY
COMMUNITY

## 2024-09-22 RX ORDER — ATORVASTATIN CALCIUM 40 MG/1
40 TABLET, FILM COATED ORAL NIGHTLY
COMMUNITY

## 2024-09-22 RX ORDER — NAPROXEN SODIUM 220 MG/1
324 TABLET, FILM COATED ORAL ONCE
Status: DISCONTINUED | OUTPATIENT
Start: 2024-09-22 | End: 2024-09-22

## 2024-09-22 RX ORDER — DEXTROSE 50 % IN WATER (D50W) INTRAVENOUS SYRINGE
12.5
Status: DISCONTINUED | OUTPATIENT
Start: 2024-09-22 | End: 2024-09-24 | Stop reason: HOSPADM

## 2024-09-22 RX ORDER — ENOXAPARIN SODIUM 100 MG/ML
60 INJECTION SUBCUTANEOUS EVERY 12 HOURS SCHEDULED
Status: DISCONTINUED | OUTPATIENT
Start: 2024-09-22 | End: 2024-09-24 | Stop reason: HOSPADM

## 2024-09-22 RX ORDER — ONDANSETRON HYDROCHLORIDE 2 MG/ML
4 INJECTION, SOLUTION INTRAVENOUS ONCE
Status: DISCONTINUED | OUTPATIENT
Start: 2024-09-22 | End: 2024-09-24 | Stop reason: HOSPADM

## 2024-09-22 RX ORDER — ENEMA 19; 7 G/133ML; G/133ML
118 ENEMA RECTAL DAILY PRN
COMMUNITY

## 2024-09-22 RX ORDER — IPRATROPIUM BROMIDE AND ALBUTEROL SULFATE 2.5; .5 MG/3ML; MG/3ML
3 SOLUTION RESPIRATORY (INHALATION) EVERY 2 HOUR PRN
Status: DISCONTINUED | OUTPATIENT
Start: 2024-09-22 | End: 2024-09-24 | Stop reason: HOSPADM

## 2024-09-22 RX ORDER — CEFTRIAXONE 1 G/50ML
1 INJECTION, SOLUTION INTRAVENOUS EVERY 12 HOURS
Status: DISCONTINUED | OUTPATIENT
Start: 2024-09-23 | End: 2024-09-23

## 2024-09-22 RX ORDER — ASPIRIN 300 MG/1
300 SUPPOSITORY RECTAL ONCE
Status: COMPLETED | OUTPATIENT
Start: 2024-09-22 | End: 2024-09-22

## 2024-09-22 RX ADMIN — AZITHROMYCIN MONOHYDRATE 500 MG: 500 INJECTION, POWDER, LYOPHILIZED, FOR SOLUTION INTRAVENOUS at 19:47

## 2024-09-22 RX ADMIN — METHYLPREDNISOLONE SODIUM SUCCINATE 125 MG: 125 INJECTION, POWDER, FOR SOLUTION INTRAMUSCULAR; INTRAVENOUS at 17:45

## 2024-09-22 RX ADMIN — ASPIRIN 300 MG: 300 SUPPOSITORY RECTAL at 21:00

## 2024-09-22 RX ADMIN — IPRATROPIUM BROMIDE AND ALBUTEROL SULFATE 3 ML: .5; 3 SOLUTION RESPIRATORY (INHALATION) at 17:53

## 2024-09-22 RX ADMIN — MAGNESIUM SULFATE HEPTAHYDRATE 2 G: 40 INJECTION, SOLUTION INTRAVENOUS at 17:47

## 2024-09-22 RX ADMIN — CEFTRIAXONE SODIUM 1 G: 1 INJECTION, SOLUTION INTRAVENOUS at 19:33

## 2024-09-22 RX ADMIN — IOHEXOL 125 ML: 350 INJECTION, SOLUTION INTRAVENOUS at 18:55

## 2024-09-22 RX ADMIN — FUROSEMIDE 40 MG: 10 INJECTION, SOLUTION INTRAMUSCULAR; INTRAVENOUS at 22:53

## 2024-09-22 RX ADMIN — IPRATROPIUM BROMIDE AND ALBUTEROL SULFATE 3 ML: .5; 3 SOLUTION RESPIRATORY (INHALATION) at 17:52

## 2024-09-22 RX ADMIN — INSULIN LISPRO 2 UNITS: 100 INJECTION, SOLUTION INTRAVENOUS; SUBCUTANEOUS at 23:01

## 2024-09-22 RX ADMIN — FUROSEMIDE 20 MG: 10 INJECTION, SOLUTION INTRAMUSCULAR; INTRAVENOUS at 21:23

## 2024-09-22 RX ADMIN — ENOXAPARIN SODIUM 60 MG: 60 INJECTION SUBCUTANEOUS at 23:01

## 2024-09-22 RX ADMIN — IPRATROPIUM BROMIDE AND ALBUTEROL SULFATE 3 ML: .5; 3 SOLUTION RESPIRATORY (INHALATION) at 17:55

## 2024-09-22 NOTE — ED TRIAGE NOTES
Patient arrives by EMS from the [facility] for evaluation after a fall last night. No anticoagulants. Per care staff, patient was altered today. Reported drug use, Narcan administered en route. Upon arrival, patient obtunded GCS 8.

## 2024-09-22 NOTE — PROGRESS NOTES
Pharmacy Medication History Review    Guillermina Bonilla is a 63 y.o. female admitted for No Principal Problem: There is no principal problem currently on the Problem List. Please update the Problem List and refresh.. Pharmacy reviewed the patient's [id number] medications and allergies for accuracy.    The list below reflectives the updated PTA list. Please review each medication in order reconciliation for additional clarification and justification.  Prior to Admission medications    Medication Sig Start Date End Date Authorizing Provider   albuterol 90 mcg/actuation inhaler Inhale 2 puffs every 6 hours if needed for shortness of breath.   Historical Provider, MD   atorvastatin (Lipitor) 40 mg tablet Take 1 tablet (40 mg) by mouth once daily at bedtime.   Historical Provider, MD   DULoxetine (Cymbalta) 30 mg DR capsule Take 1 capsule (30 mg) by mouth once daily. With 60mg capsule   Historical Provider, MD   gabapentin (Neurontin) 300 mg capsule Take 2 capsules (600 mg) by mouth 3 times a day.   Historical Provider, MD   HumaLOG KwikPen Insulin 100 unit/mL injection Inject 50 Units under the skin 3 times a day. Plus sliding scale. Hold for BS<200   Historical Provider, MD   melatonin 3 mg tablet Take 1 tablet (3 mg) by mouth once daily at bedtime.   Historical Provider, MD   ondansetron (Zofran) 4 mg tablet Take 1 tablet (4 mg) by mouth every 12 hours if needed for nausea or vomiting.   Historical Provider, MD   tiZANidine (Zanaflex) 2 mg tablet Take 1 tablet (2 mg) by mouth every 8 hours if needed (back pain).   Historical Provider, MD   traZODone (Desyrel) 150 mg tablet Take 1 tablet (150 mg) by mouth once daily at bedtime.   Historical Provider, MD   acetaminophen (Tylenol) 325 mg tablet Take 2 tablets (650 mg) by mouth every 6 hours if needed for mild pain (1 - 3).   Historical Provider, MD   albuterol 2.5 mg /3 mL (0.083 %) nebulizer solution Take 3 mL (2.5 mg) by nebulization every 4 hours if needed for  shortness of breath.   Historical Provider, MD   alum-mag hydroxide-simeth (Maalox MAX) 400-400-40 mg/5 mL suspension Take 30 mL by mouth every 6 hours if needed for indigestion or heartburn.   Historical Provider, MD   bisacodyl (Dulcolax) 10 mg suppository Insert 1 suppository (10 mg) into the rectum once daily as needed for constipation.   Historical Provider, MD   cholecalciferol (Vitamin D-3) 25 MCG (1000 UT) tablet Take 2 tablets (2,000 Units) by mouth once daily at bedtime.   Historical Provider, MD   cyanocobalamin (Vitamin B-12) 500 mcg tablet Take 1 tablet (500 mcg) by mouth once daily at bedtime.   Historical Provider, MD   DULoxetine (Cymbalta) 60 mg DR capsule Take 1 capsule (60 mg) by mouth once daily. With 30mg capsule   Historical Provider, MD   ibuprofen 200 mg tablet Take 3 tablets (600 mg) by mouth every 6 hours if needed for mild pain (1 - 3).   Historical Provider, MD   insulin glargine-yfgn (Semglee,insulin glarg-yfgn,Pen) 100 unit/mL (3 mL) Pen Inject 90 Units under the skin once daily at bedtime.   Historical Provider, MD   insulin lispro (HumaLOG KwikPen Insulin) 100 unit/mL injection Inject 0-14 Units under the skin 3 times a day. Per sliding scale: <60 = notify MD, 151-200 = 2 units, 201-250 = 4 units, 251-300 = 6 units, 301-350 = 8 units, 351-400 = 10 units, 401-450 = 12 units, 451-500 = 14 units, >500 = notify MD   Historical Provider, MD   lidocaine 4 % patch Place 2 patches on the skin once daily as needed for mild pain (1 - 3). To bach and chest. Remove & discard patch within 12 hours or as directed by MD.   Historical Provider, MD   magnesium hydroxide (Milk of Magnesia) 400 mg/5 mL suspension Take 30 mL by mouth once daily as needed for constipation.   Historical Provider, MD   menthol 5.4 mg lozenge Place 1 drop into mouth between cheek and gum every 4 hours if needed (cough).   Historical Provider, MD   metoprolol tartrate (Lopressor) 25 mg tablet Take 1 tablet (25 mg) by mouth 2  times a day.   Historical Provider, MD   omeprazole (PriLOSEC) 20 mg DR capsule Take 1 capsule (20 mg) by mouth 2 times a day.   Historical Provider, MD   oxybutynin XL (Ditropan-XL) 15 mg 24 hr tablet Take 1 tablet (15 mg) by mouth once daily. Do not crush, chew, or split.   Historical Provider, MD   polyethylene glycol (Miralax) 17 gram/dose powder Take 17 g by mouth once daily as needed (constipation).   Historical Provider, MD   sodium phosphates (Fleet Enema) 19-7 gram/118 mL enema enema Insert 118 mL into the rectum once daily as needed for constipation.   Historical Provider, MD        The list below reflectives the updated allergy list. Please review each documented allergy for additional clarification and justification.  Allergies  Reviewed by Germaine Parker on 9/22/2024        Severity Reactions Comments    Diclofenac Epolamine Medium Hives, Itching, Swelling     Tetracycline Medium Rash     Macrobid [nitrofurantoin Monohyd/m-cryst] Not Specified Unknown     Prochlorperazine Not Specified Unknown     Adhesive Tape-silicones Low Rash     Amoxicillin-pot Clavulanate Low Rash     Azithromycin Low Rash     Benzalkonium Chloride Low Swelling     Erythromycin Low Rash     Neomycin-bacitracin-polymyxin Low Swelling     Nitro Low Rash Has tolerated NTG SL tsbs            Below are additional concerns with the patient's PTA list.      Germaine Parker

## 2024-09-22 NOTE — ED PROVIDER NOTES
Emergency Department Provider Note        History of Present Illness     History provided by: Patient and EMS  Limitations to History: Trauma Activation    HPI:  Guillermina Bonilla is a 63 y.o. female presenting to the ED as a Limited Trauma Activation after fall with AMS.  She was noted to have had a fall yesterday after smoking crack at her nursing facility.  She is not on blood thinners she did not pass out at this time.  She has been ambulating okay throughout the day today however became acutely altered.  Limited history from patient given altered mental status.  She awakes and knows her name but not where she is at.  Physical Exam   Arrival Vitals:  T 36.5 °C (97.7 °F)  HR 89  /60  RR 18  O2 (!) 78 % None (Room air)    Primary Survey:  Airway: Intact & patent  Breathing: Equal severely reduced breath sounds bilaterally  Circulation: 2+ radial and DP pulses bilaterally  Disability: GCS 13,  Gross motor and sensation intact in the bilateral upper and lower extremities.  Exposure: Patient fully exposed, warm blankets applied    Secondary Survey:    Head: Abrasion to the right forehead, mild proptosis of the right eye   eye: Pupils round, and reactive to light.  Right pupil 4-2 left pupil 6-2 gaze is conjugate. No orbital ridge bony step-offs, or tenderness.  ENT:   Midface is stable.  No mandibular tenderness or dental malocclusion's.  There is no nasal bone tenderness or deformity.  No epistaxis.  No blood or CSF drainage and external auditory canals.  No intraoral lesions.  Neck: Cervical collar not in place. There is no C-spine midline tenderness to palpation, step-offs, or deformities.  Trachea is midline.  Chest: Clear to auscultation bilaterally, no chest wall tenderness palpation, crepitus, flail segments noted.  No bruising or abrasions noted to the anterior chest wall.  Cardiovascular: Regular rate, rhythm  Abdomen: Soft, nontender, nondistended.  No bruising or lacerations noted.  Not  peritonitic.  Pelvis: Stable to compression  : Normal external genitalia, no blood at the urethral meatus  Back/spine: No midline T or L-spine tenderness palpation, step-offs, or deformities.  No lacerations, abrasions, or bruising noted.  Extremities: No gross bony deformities, no bony tenderness palpation.  Full range of motion all in 4 extremities.  Skin: No lacerations, bruises, abrasions otherwise noted.  Neuro: Alert and oriented to person, only.  Face symmetric, speech fluent.  Gross motor and sensory function intact in the bilateral upper and lower extremities.    Medical Decision Making & ED Course   Medical Decision Makin y.o. female presenting to the ED as a Limited Trauma Activation after a fall greater than 24 hours ago not on blood thinners now with altered mental status.  On arrival to the ED, the patient was immediately brought to the resuscitation bay.  Overall does have a discordant pupil exam and abrasion to the head.  The report is that she had smoked crack cocaine in the last 48 hours, additional report that she is, altered today.  She does have a history of COPD no CHF history seen in the she has a history of CAD.  No ischemic changes on EKG and at this time I have concern that she has a combined CHF and COPD exacerbation.  She is treated with BiPAP, Lasix Solu-Medrol DuoNebs magnesium and had mild improvement on hypercapnia.  Mental status also improving.  She has acute leukocytosis that could be reactionary versus factious.  Will also cover with ceftriaxone and azithromycin.  In the setting of hypoxia COPD exacerbation and potential pneumonia.  There is effusion seen on chest x-ray again fitting with both CHF and COPD.  CT imaging reveals no acute fracture of the CT or L-spine no intracranial pathology atelectasis without obvious pneumonia no intra-abdominal pathology.  Unfortunately contrasted extravasate into the arm which was treated with compress and ice packs.  Patient to be  admitted to medicine versus ICU if unable to wean BiPAP.  ----    EKG Independent Interpretation: EKG interpreted by myself. Please see ED Course for full interpretation.    Independent Result Review and Interpretation: Relevant laboratory and radiographic results were reviewed and independently interpreted by myself.  As necessary, they are commented on in the ED Course.    Social Determinants of Health which Significantly Impact Care: Substance use disorder and Mental health disorder     Chronic conditions affecting the patient's care: As documented above in [facility]    The patient was discussed with the following consultants/services: Admitting team    Care Considerations: As documented above in [facility]    ED Course:  ED Course as of 09/23/24 1812   Sun Sep 22, 2024   1924 CBC and Auto Differential(!)  Patient with leukocytosis anemia without significant thrombocytopenia, elevated neutrophil count [SC]   1924 Comprehensive metabolic panel(!)  Mildly elevated creatinine without significant electrolyte or hepatic abnormality. [SC]   1924 BLOOD GAS VENOUS FULL PANEL(!!)  Patient's repeat blood gas with mild improvement in acidosis and mild improvement in hypercapnia.  Adjustments made to BiPAP and patient will continue to remain on BiPAP at this time. [SC]   1925 B-Type Natriuretic Peptide(!) [SC]   1925 Troponin I Series, High Sensitivity (0, 1 HR)(!!)  Patient with elevated BNP will treat with 20 of Lasix patient's troponin elevated 343 was given aspirin. [SC]   1925 Electrocardiogram, 12-lead  EKG was sinus rhythm heart rate of 72 W[place] interval 170 MS QRS 96 MS QTc 416 MS within normal limits no acute ST segment elevations or T wave inversions concerning for acute ischemia, patient does have a left axis deviation but no additional arrhythmia or heart block.  Ischemic changes. [SC]   2039 Repeat EKG sinus rhythm LAD anterior TWI and mild lateral STD similar to prior no STEMI [EK]      ED Course User Index  [EK] Sravani ANN  Klerman, MD  [SC] Marilou Paige DO         Diagnoses as of 09/23/24 1812   Acute hypercapnic respiratory failure (Multi)   Acute heart failure, unspecified heart failure type (Multi)   Elevated troponin   SIXTO (acute kidney injury) ([facility])   Fall, initial encounter       Disposition   Patient to be admitted to medicine most likely ICU in the setting of hypercapnic and hypoxic respiratory failure    Procedures   Procedures    Patient seen and discussed with ED attending physician.    Marilou Paige DO  Emergency Medicine     Marilou Paige DO  Resident  09/23/24 1955

## 2024-09-23 ENCOUNTER — APPOINTMENT (OUTPATIENT)
Dept: RADIOLOGY | Facility: HOSPITAL | Age: 64
DRG: 280 | End: 2024-09-23
Payer: MEDICARE

## 2024-09-23 ENCOUNTER — APPOINTMENT (OUTPATIENT)
Dept: CARDIOLOGY | Facility: HOSPITAL | Age: 64
DRG: 280 | End: 2024-09-23
Payer: MEDICARE

## 2024-09-23 VITALS
DIASTOLIC BLOOD PRESSURE: 74 MMHG | HEART RATE: 72 BPM | BODY MASS INDEX: 50.02 KG/M2 | TEMPERATURE: 97.2 F | WEIGHT: 293 LBS | OXYGEN SATURATION: 98 % | SYSTOLIC BLOOD PRESSURE: 124 MMHG | HEIGHT: 64 IN | RESPIRATION RATE: 23 BRPM

## 2024-09-23 LAB
ALBUMIN SERPL BCP-MCNC: 3.6 G/DL (ref 3.4–5)
ANION GAP SERPL CALC-SCNC: 11 MMOL/L (ref 10–20)
BUN SERPL-MCNC: 18 MG/DL (ref 6–23)
CALCIUM SERPL-MCNC: 8.9 MG/DL (ref 8.6–10.3)
CARDIAC TROPONIN I PNL SERPL HS: 465 NG/L (ref 0–13)
CARDIAC TROPONIN I PNL SERPL HS: 482 NG/L (ref 0–13)
CHLORIDE SERPL-SCNC: 101 MMOL/L (ref 98–107)
CO2 SERPL-SCNC: 31 MMOL/L (ref 21–32)
CREAT SERPL-MCNC: 0.96 MG/DL (ref 0.5–1.05)
EGFRCR SERPLBLD CKD-EPI 2021: 66 ML/MIN/1.73M*2
ERYTHROCYTE [DISTWIDTH] IN BLOOD BY AUTOMATED COUNT: 13.2 % (ref 11.5–14.5)
GLUCOSE BLD MANUAL STRIP-MCNC: 191 MG/DL (ref 74–99)
GLUCOSE BLD MANUAL STRIP-MCNC: 209 MG/DL (ref 74–99)
GLUCOSE BLD MANUAL STRIP-MCNC: 265 MG/DL (ref 74–99)
GLUCOSE BLD MANUAL STRIP-MCNC: 294 MG/DL (ref 74–99)
GLUCOSE BLD MANUAL STRIP-MCNC: 415 MG/DL (ref 74–99)
GLUCOSE SERPL-MCNC: 226 MG/DL (ref 74–99)
HCT VFR BLD AUTO: 37.7 % (ref 36–46)
HGB BLD-MCNC: 12.5 G/DL (ref 12–16)
HOLD SPECIMEN: NORMAL
MAGNESIUM SERPL-MCNC: 2.03 MG/DL (ref 1.6–2.4)
MCH RBC QN AUTO: 31.6 PG (ref 26–34)
MCHC RBC AUTO-ENTMCNC: 33.2 G/DL (ref 32–36)
MCV RBC AUTO: 95 FL (ref 80–100)
NRBC BLD-RTO: 0.5 /100 WBCS (ref 0–0)
PHOSPHATE SERPL-MCNC: 2.9 MG/DL (ref 2.5–4.9)
PLATELET # BLD AUTO: 124 X10*3/UL (ref 150–450)
POTASSIUM SERPL-SCNC: 4.3 MMOL/L (ref 3.5–5.3)
PROCALCITONIN SERPL-MCNC: 0.33 NG/ML
RBC # BLD AUTO: 3.95 X10*6/UL (ref 4–5.2)
SODIUM SERPL-SCNC: 139 MMOL/L (ref 136–145)
WBC # BLD AUTO: 11 X10*3/UL (ref 4.4–11.3)

## 2024-09-23 PROCEDURE — 2500000004 HC RX 250 GENERAL PHARMACY W/ HCPCS (ALT 636 FOR OP/ED): Performed by: STUDENT IN AN ORGANIZED HEALTH CARE EDUCATION/TRAINING PROGRAM

## 2024-09-23 PROCEDURE — 36415 COLL VENOUS BLD VENIPUNCTURE: CPT | Performed by: ANESTHESIOLOGY

## 2024-09-23 PROCEDURE — 86738 MYCOPLASMA ANTIBODY: CPT

## 2024-09-23 PROCEDURE — 2500000002 HC RX 250 W HCPCS SELF ADMINISTERED DRUGS (ALT 637 FOR MEDICARE OP, ALT 636 FOR OP/ED)

## 2024-09-23 PROCEDURE — 94640 AIRWAY INHALATION TREATMENT: CPT

## 2024-09-23 PROCEDURE — 2500000004 HC RX 250 GENERAL PHARMACY W/ HCPCS (ALT 636 FOR OP/ED): Performed by: ANESTHESIOLOGY

## 2024-09-23 PROCEDURE — 70450 CT HEAD/BRAIN W/O DYE: CPT

## 2024-09-23 PROCEDURE — 84145 PROCALCITONIN (PCT): CPT | Mod: PARLAB

## 2024-09-23 PROCEDURE — 84484 ASSAY OF TROPONIN QUANT: CPT | Performed by: ANESTHESIOLOGY

## 2024-09-23 PROCEDURE — 99291 CRITICAL CARE FIRST HOUR: CPT

## 2024-09-23 PROCEDURE — 94799 UNLISTED PULMONARY SVC/PX: CPT

## 2024-09-23 PROCEDURE — 2500000004 HC RX 250 GENERAL PHARMACY W/ HCPCS (ALT 636 FOR OP/ED)

## 2024-09-23 PROCEDURE — 87449 NOS EACH ORGANISM AG IA: CPT | Mod: PARLAB

## 2024-09-23 PROCEDURE — 2500000001 HC RX 250 WO HCPCS SELF ADMINISTERED DRUGS (ALT 637 FOR MEDICARE OP): Performed by: INTERNAL MEDICINE

## 2024-09-23 PROCEDURE — 83735 ASSAY OF MAGNESIUM: CPT | Performed by: ANESTHESIOLOGY

## 2024-09-23 PROCEDURE — 93005 ELECTROCARDIOGRAM TRACING: CPT

## 2024-09-23 PROCEDURE — 85027 COMPLETE CBC AUTOMATED: CPT | Performed by: ANESTHESIOLOGY

## 2024-09-23 PROCEDURE — 83036 HEMOGLOBIN GLYCOSYLATED A1C: CPT | Performed by: ANESTHESIOLOGY

## 2024-09-23 PROCEDURE — 2500000002 HC RX 250 W HCPCS SELF ADMINISTERED DRUGS (ALT 637 FOR MEDICARE OP, ALT 636 FOR OP/ED): Performed by: ANESTHESIOLOGY

## 2024-09-23 PROCEDURE — 36415 COLL VENOUS BLD VENIPUNCTURE: CPT

## 2024-09-23 PROCEDURE — 94660 CPAP INITIATION&MGMT: CPT

## 2024-09-23 PROCEDURE — 82947 ASSAY GLUCOSE BLOOD QUANT: CPT

## 2024-09-23 PROCEDURE — 2020000001 HC ICU ROOM DAILY

## 2024-09-23 PROCEDURE — 2500000001 HC RX 250 WO HCPCS SELF ADMINISTERED DRUGS (ALT 637 FOR MEDICARE OP): Performed by: ANESTHESIOLOGY

## 2024-09-23 PROCEDURE — 80069 RENAL FUNCTION PANEL: CPT | Performed by: ANESTHESIOLOGY

## 2024-09-23 PROCEDURE — 2500000001 HC RX 250 WO HCPCS SELF ADMINISTERED DRUGS (ALT 637 FOR MEDICARE OP)

## 2024-09-23 PROCEDURE — 70450 CT HEAD/BRAIN W/O DYE: CPT | Performed by: RADIOLOGY

## 2024-09-23 RX ORDER — CEFTRIAXONE 2 G/50ML
2 INJECTION, SOLUTION INTRAVENOUS EVERY 24 HOURS
Status: DISCONTINUED | OUTPATIENT
Start: 2024-09-24 | End: 2024-09-24 | Stop reason: HOSPADM

## 2024-09-23 RX ORDER — HYDRALAZINE HYDROCHLORIDE 20 MG/ML
10 INJECTION INTRAMUSCULAR; INTRAVENOUS EVERY 4 HOURS PRN
Status: DISCONTINUED | OUTPATIENT
Start: 2024-09-23 | End: 2024-09-24 | Stop reason: HOSPADM

## 2024-09-23 RX ORDER — ACETAMINOPHEN 325 MG/1
975 TABLET ORAL EVERY 6 HOURS PRN
Status: DISCONTINUED | OUTPATIENT
Start: 2024-09-23 | End: 2024-09-24 | Stop reason: HOSPADM

## 2024-09-23 RX ORDER — NICARDIPINE HYDROCHLORIDE 0.2 MG/ML
2.5-15 INJECTION INTRAVENOUS CONTINUOUS
Status: DISCONTINUED | OUTPATIENT
Start: 2024-09-23 | End: 2024-09-24

## 2024-09-23 RX ORDER — INSULIN GLARGINE 100 [IU]/ML
45 INJECTION, SOLUTION SUBCUTANEOUS DAILY
Status: DISCONTINUED | OUTPATIENT
Start: 2024-09-23 | End: 2024-09-24

## 2024-09-23 RX ORDER — TIZANIDINE 4 MG/1
2 TABLET ORAL EVERY 8 HOURS PRN
Status: DISCONTINUED | OUTPATIENT
Start: 2024-09-23 | End: 2024-09-24 | Stop reason: HOSPADM

## 2024-09-23 RX ORDER — DEXTROSE 50 % IN WATER (D50W) INTRAVENOUS SYRINGE
12.5
Status: DISCONTINUED | OUTPATIENT
Start: 2024-09-23 | End: 2024-09-23 | Stop reason: SDUPTHER

## 2024-09-23 RX ORDER — TRAZODONE HYDROCHLORIDE 50 MG/1
150 TABLET ORAL NIGHTLY
Status: DISCONTINUED | OUTPATIENT
Start: 2024-09-23 | End: 2024-09-24 | Stop reason: HOSPADM

## 2024-09-23 RX ORDER — IPRATROPIUM BROMIDE AND ALBUTEROL SULFATE 2.5; .5 MG/3ML; MG/3ML
3 SOLUTION RESPIRATORY (INHALATION)
Status: DISCONTINUED | OUTPATIENT
Start: 2024-09-23 | End: 2024-09-24 | Stop reason: HOSPADM

## 2024-09-23 RX ORDER — GUAIFENESIN 600 MG/1
600 TABLET, EXTENDED RELEASE ORAL 2 TIMES DAILY PRN
Status: DISCONTINUED | OUTPATIENT
Start: 2024-09-23 | End: 2024-09-24 | Stop reason: HOSPADM

## 2024-09-23 RX ORDER — NIFEDIPINE 30 MG/1
30 TABLET, FILM COATED, EXTENDED RELEASE ORAL
Status: DISCONTINUED | OUTPATIENT
Start: 2024-09-23 | End: 2024-09-24

## 2024-09-23 RX ORDER — METOPROLOL TARTRATE 25 MG/1
25 TABLET, FILM COATED ORAL 2 TIMES DAILY
Status: DISCONTINUED | OUTPATIENT
Start: 2024-09-23 | End: 2024-09-24 | Stop reason: HOSPADM

## 2024-09-23 RX ORDER — NICARDIPINE HYDROCHLORIDE 0.2 MG/ML
2.5-15 INJECTION INTRAVENOUS CONTINUOUS
Status: DISCONTINUED | OUTPATIENT
Start: 2024-09-23 | End: 2024-09-23

## 2024-09-23 RX ORDER — FUROSEMIDE 10 MG/ML
40 INJECTION INTRAMUSCULAR; INTRAVENOUS ONCE
Status: COMPLETED | OUTPATIENT
Start: 2024-09-23 | End: 2024-09-23

## 2024-09-23 RX ORDER — GABAPENTIN 300 MG/1
600 CAPSULE ORAL EVERY 8 HOURS SCHEDULED
Status: DISCONTINUED | OUTPATIENT
Start: 2024-09-23 | End: 2024-09-24 | Stop reason: HOSPADM

## 2024-09-23 RX ORDER — INSULIN GLARGINE 100 [IU]/ML
45 INJECTION, SOLUTION SUBCUTANEOUS NIGHTLY
Status: DISCONTINUED | OUTPATIENT
Start: 2024-09-23 | End: 2024-09-23

## 2024-09-23 RX ORDER — DEXTROSE 50 % IN WATER (D50W) INTRAVENOUS SYRINGE
25
Status: DISCONTINUED | OUTPATIENT
Start: 2024-09-23 | End: 2024-09-23 | Stop reason: SDUPTHER

## 2024-09-23 RX ORDER — DULOXETIN HYDROCHLORIDE 30 MG/1
90 CAPSULE, DELAYED RELEASE ORAL DAILY
Status: DISCONTINUED | OUTPATIENT
Start: 2024-09-23 | End: 2024-09-24 | Stop reason: HOSPADM

## 2024-09-23 RX ADMIN — TIZANIDINE 2 MG: 4 TABLET ORAL at 20:26

## 2024-09-23 RX ADMIN — NIFEDIPINE 30 MG: 30 TABLET, FILM COATED, EXTENDED RELEASE ORAL at 16:41

## 2024-09-23 RX ADMIN — HYDRALAZINE HYDROCHLORIDE 10 MG: 20 INJECTION INTRAMUSCULAR; INTRAVENOUS at 13:22

## 2024-09-23 RX ADMIN — ACETAMINOPHEN 975 MG: 325 TABLET ORAL at 03:26

## 2024-09-23 RX ADMIN — TRAZODONE HYDROCHLORIDE 150 MG: 50 TABLET ORAL at 20:26

## 2024-09-23 RX ADMIN — ACETAMINOPHEN 975 MG: 325 TABLET ORAL at 20:26

## 2024-09-23 RX ADMIN — GABAPENTIN 600 MG: 300 CAPSULE ORAL at 13:22

## 2024-09-23 RX ADMIN — HYDRALAZINE HYDROCHLORIDE 10 MG: 20 INJECTION INTRAMUSCULAR; INTRAVENOUS at 17:43

## 2024-09-23 RX ADMIN — METHYLPREDNISOLONE SODIUM SUCCINATE 40 MG: 40 INJECTION, POWDER, FOR SOLUTION INTRAMUSCULAR; INTRAVENOUS at 09:44

## 2024-09-23 RX ADMIN — INSULIN LISPRO 3 UNITS: 100 INJECTION, SOLUTION INTRAVENOUS; SUBCUTANEOUS at 17:43

## 2024-09-23 RX ADMIN — ACETAMINOPHEN 975 MG: 325 TABLET ORAL at 15:14

## 2024-09-23 RX ADMIN — DULOXETINE HYDROCHLORIDE 90 MG: 30 CAPSULE, DELAYED RELEASE ORAL at 08:00

## 2024-09-23 RX ADMIN — GUAIFENESIN 600 MG: 600 TABLET, EXTENDED RELEASE ORAL at 17:42

## 2024-09-23 RX ADMIN — GABAPENTIN 600 MG: 300 CAPSULE ORAL at 20:30

## 2024-09-23 RX ADMIN — ENOXAPARIN SODIUM 60 MG: 60 INJECTION SUBCUTANEOUS at 08:00

## 2024-09-23 RX ADMIN — TIZANIDINE 2 MG: 4 TABLET ORAL at 13:23

## 2024-09-23 RX ADMIN — IPRATROPIUM BROMIDE AND ALBUTEROL SULFATE 3 ML: .5; 3 SOLUTION RESPIRATORY (INHALATION) at 18:33

## 2024-09-23 RX ADMIN — INSULIN GLARGINE 45 UNITS: 100 INJECTION, SOLUTION SUBCUTANEOUS at 10:44

## 2024-09-23 RX ADMIN — METHYLPREDNISOLONE SODIUM SUCCINATE 40 MG: 40 INJECTION, POWDER, FOR SOLUTION INTRAMUSCULAR; INTRAVENOUS at 01:49

## 2024-09-23 RX ADMIN — CEFTRIAXONE SODIUM 1 G: 1 INJECTION, SOLUTION INTRAVENOUS at 06:49

## 2024-09-23 RX ADMIN — METOPROLOL TARTRATE 25 MG: 25 TABLET, FILM COATED ORAL at 20:26

## 2024-09-23 RX ADMIN — INSULIN LISPRO 3 UNITS: 100 INJECTION, SOLUTION INTRAVENOUS; SUBCUTANEOUS at 12:13

## 2024-09-23 RX ADMIN — IPRATROPIUM BROMIDE AND ALBUTEROL SULFATE 3 ML: .5; 3 SOLUTION RESPIRATORY (INHALATION) at 11:41

## 2024-09-23 RX ADMIN — GABAPENTIN 600 MG: 300 CAPSULE ORAL at 05:32

## 2024-09-23 RX ADMIN — INSULIN LISPRO 2 UNITS: 100 INJECTION, SOLUTION INTRAVENOUS; SUBCUTANEOUS at 06:30

## 2024-09-23 RX ADMIN — FUROSEMIDE 40 MG: 10 INJECTION, SOLUTION INTRAMUSCULAR; INTRAVENOUS at 10:44

## 2024-09-23 RX ADMIN — ENOXAPARIN SODIUM 60 MG: 60 INJECTION SUBCUTANEOUS at 20:26

## 2024-09-23 RX ADMIN — TIZANIDINE 2 MG: 4 TABLET ORAL at 03:26

## 2024-09-23 RX ADMIN — AZITHROMYCIN MONOHYDRATE 500 MG: 500 INJECTION, POWDER, LYOPHILIZED, FOR SOLUTION INTRAVENOUS at 20:25

## 2024-09-23 SDOH — SOCIAL STABILITY: SOCIAL INSECURITY: ABUSE: ADULT

## 2024-09-23 SDOH — SOCIAL STABILITY: SOCIAL INSECURITY: DO YOU FEEL ANYONE HAS EXPLOITED OR TAKEN ADVANTAGE OF YOU FINANCIALLY OR OF YOUR PERSONAL PROPERTY?: UNABLE TO ASSESS

## 2024-09-23 SDOH — SOCIAL STABILITY: SOCIAL INSECURITY: ARE THERE ANY APPARENT SIGNS OF INJURIES/BEHAVIORS THAT COULD BE RELATED TO ABUSE/NEGLECT?: UNABLE TO ASSESS

## 2024-09-23 SDOH — SOCIAL STABILITY: SOCIAL INSECURITY: DOES ANYONE TRY TO KEEP YOU FROM HAVING/CONTACTING OTHER FRIENDS OR DOING THINGS OUTSIDE YOUR HOME?: UNABLE TO ASSESS

## 2024-09-23 SDOH — SOCIAL STABILITY: SOCIAL INSECURITY: HAVE YOU HAD THOUGHTS OF HARMING ANYONE ELSE?: UNABLE TO ASSESS

## 2024-09-23 SDOH — SOCIAL STABILITY: SOCIAL INSECURITY: HAVE YOU HAD ANY THOUGHTS OF HARMING ANYONE ELSE?: UNABLE TO ASSESS

## 2024-09-23 SDOH — SOCIAL STABILITY: SOCIAL INSECURITY: DO YOU FEEL UNSAFE GOING BACK TO THE PLACE WHERE YOU ARE LIVING?: UNABLE TO ASSESS

## 2024-09-23 SDOH — SOCIAL STABILITY: SOCIAL INSECURITY: ARE YOU OR HAVE YOU BEEN THREATENED OR ABUSED PHYSICALLY, EMOTIONALLY, OR SEXUALLY BY ANYONE?: UNABLE TO ASSESS

## 2024-09-23 SDOH — SOCIAL STABILITY: SOCIAL INSECURITY: WERE YOU ABLE TO COMPLETE ALL THE BEHAVIORAL HEALTH SCREENINGS?: NO

## 2024-09-23 SDOH — SOCIAL STABILITY: SOCIAL INSECURITY: HAS ANYONE EVER THREATENED TO HURT YOUR FAMILY OR YOUR PETS?: UNABLE TO ASSESS

## 2024-09-23 ASSESSMENT — COGNITIVE AND FUNCTIONAL STATUS - GENERAL
TOILETING: A LOT
STANDING UP FROM CHAIR USING ARMS: A LOT
DRESSING REGULAR LOWER BODY CLOTHING: TOTAL
CLIMB 3 TO 5 STEPS WITH RAILING: A LOT
HELP NEEDED FOR BATHING: A LOT
PERSONAL GROOMING: A LITTLE
MOVING TO AND FROM BED TO CHAIR: A LITTLE
DRESSING REGULAR UPPER BODY CLOTHING: TOTAL
MOBILITY SCORE: 8
EATING MEALS: A LOT
PERSONAL GROOMING: A LOT
STANDING UP FROM CHAIR USING ARMS: TOTAL
TOILETING: TOTAL
MOVING FROM LYING ON BACK TO SITTING ON SIDE OF FLAT BED WITH BEDRAILS: A LITTLE
DAILY ACTIVITIY SCORE: 12
MOBILITY SCORE: 15
HELP NEEDED FOR BATHING: TOTAL
MOVING FROM LYING ON BACK TO SITTING ON SIDE OF FLAT BED WITH BEDRAILS: A LITTLE
CLIMB 3 TO 5 STEPS WITH RAILING: TOTAL
MOVING TO AND FROM BED TO CHAIR: TOTAL
DAILY ACTIVITIY SCORE: 10
WALKING IN HOSPITAL ROOM: A LOT
WALKING IN HOSPITAL ROOM: TOTAL
EATING MEALS: A LITTLE
TURNING FROM BACK TO SIDE WHILE IN FLAT BAD: A LITTLE
DRESSING REGULAR UPPER BODY CLOTHING: A LOT
TURNING FROM BACK TO SIDE WHILE IN FLAT BAD: TOTAL
DRESSING REGULAR LOWER BODY CLOTHING: A LOT
PATIENT BASELINE BEDBOUND: NO

## 2024-09-23 ASSESSMENT — PAIN DESCRIPTION - LOCATION
LOCATION: HEAD
LOCATION: BACK
LOCATION: BACK

## 2024-09-23 ASSESSMENT — ACTIVITIES OF DAILY LIVING (ADL)
WALKS IN HOME: NEEDS ASSISTANCE
ADEQUATE_TO_COMPLETE_ADL: YES
GROOMING: NEEDS ASSISTANCE
HEARING - RIGHT EAR: FUNCTIONAL
DRESSING YOURSELF: NEEDS ASSISTANCE
LACK_OF_TRANSPORTATION: NO
HEARING - LEFT EAR: FUNCTIONAL
PATIENT'S MEMORY ADEQUATE TO SAFELY COMPLETE DAILY ACTIVITIES?: YES
JUDGMENT_ADEQUATE_SAFELY_COMPLETE_DAILY_ACTIVITIES: YES
TOILETING: NEEDS ASSISTANCE
ASSISTIVE_DEVICE: EYEGLASSES
FEEDING YOURSELF: INDEPENDENT
BATHING: NEEDS ASSISTANCE

## 2024-09-23 ASSESSMENT — PAIN - FUNCTIONAL ASSESSMENT
PAIN_FUNCTIONAL_ASSESSMENT: 0-10

## 2024-09-23 ASSESSMENT — PAIN SCALES - GENERAL
PAINLEVEL_OUTOF10: 0 - NO PAIN
PAINLEVEL_OUTOF10: 3
PAINLEVEL_OUTOF10: 0 - NO PAIN
PAINLEVEL_OUTOF10: 0 - NO PAIN
PAINLEVEL_OUTOF10: 3
PAINLEVEL_OUTOF10: 0 - NO PAIN
PAINLEVEL_OUTOF10: 3

## 2024-09-23 ASSESSMENT — LIFESTYLE VARIABLES
AUDIT-C TOTAL SCORE: -1
HOW OFTEN DO YOU HAVE A DRINK CONTAINING ALCOHOL: PATIENT UNABLE TO ANSWER
HOW MANY STANDARD DRINKS CONTAINING ALCOHOL DO YOU HAVE ON A TYPICAL DAY: PATIENT UNABLE TO ANSWER
SKIP TO QUESTIONS 9-10: 0
HOW OFTEN DO YOU HAVE 6 OR MORE DRINKS ON ONE OCCASION: PATIENT UNABLE TO ANSWER
AUDIT-C TOTAL SCORE: -1

## 2024-09-23 ASSESSMENT — PAIN DESCRIPTION - ORIENTATION
ORIENTATION: LOWER;MID
ORIENTATION: LOWER

## 2024-09-23 NOTE — PROGRESS NOTES
[facility]   Progress Note        Subjective     Alert and oriented this morning.  Currently off BiPAP mask temporarily and saturating 93 to 94% on nasal cannula.  She states that she passed out at home but does not remember how she felt before the episode.  Also states that she feels somewhat short of breath at the moment.  Does not take any Lasix at home.  Denies nausea, chest pain, fever/chills, abdominal pain.    Scheduled Medications:   azithromycin, 500 mg, intravenous, q24h  cefTRIAXone, 1 g, intravenous, q12h  DULoxetine, 90 mg, oral, Daily  enoxaparin, 60 mg, subcutaneous, q12h RAMA  gabapentin, 600 mg, oral, q8h RAMA  insulin lispro, 0-5 Units, subcutaneous, q6h  methylPREDNISolone sodium succinate (PF), 40 mg, intravenous, q8h  ondansetron, 4 mg, intravenous, Once       Continuous Medications:       PRN Medications:   PRN medications: acetaminophen, dextrose, dextrose, glucagon, glucagon, ipratropium-albuteroL, oxygen, tiZANidine    Objective   Vitals:  Most Recent:  Vitals:    09/23/24 0600   BP: 160/79   Pulse: 57   Resp: 20   Temp:    SpO2: 95%       24hr Min/Max:  Temp  Min: 36 °C (96.8 °F)  Max: 36.5 °C (97.7 °F)  Pulse  Min: 57  Max: 89  BP  Min: 102/57  Max: 160/79  Resp  Min: 15  Max: 38  SpO2  Min: 76 %  Max: 100 %    LDA:   Urethral Catheter Non-latex (Active)   Placement Date/Time: 09/22/24 2113   Hand Hygiene Completed: Yes  Catheter Type: Non-latex  Catheter Balloon Size: 10 mL   Number of days: 0         Vent settings:  FiO2 (%):  [50 %-100 %] 50 %  S RR:  [14-20] 20    Hemodynamic parameters for last 24 hours:         Intake/Output Summary (Last 24 hours) at 9/23/2024 0655  Last data filed at 9/23/2024 0600  Gross per 24 hour   Intake --   Output 1690 ml   Net -1690 ml         Physical exam:    Physical Exam  Vitals and nursing note reviewed.   Constitutional:       Appearance: She is obese. She is ill-appearing.   Cardiovascular:      Rate and  Rhythm: Normal rate and regular rhythm.   Pulmonary:      Effort: Pulmonary effort is normal. No respiratory distress.      Breath sounds: Rales present.   Abdominal:      General: Abdomen is flat.      Palpations: Abdomen is soft.      Tenderness: There is no abdominal tenderness.   Musculoskeletal:      Right lower leg: Edema (trace) present.      Left lower leg: Edema (trace) present.   Skin:     General: Skin is warm and dry.   Neurological:      General: No focal deficit present.      Mental Status: She is alert and oriented to person, place, and time.        Lab/Radiology/Diagnostic Review:    All labs and Imaging have been personally reviewed.     Assessment/Plan     63F with a PMH of fibromyalgia, CAD s/p CABG, HFpEF, COPD, YADIRA, obesity class III, and T2DM presented to the ED from SNF for AMS.        Neuro:    #Acute metabolic encephalopathy, improving  -CT head/c-spine negative for acute pathology   -Toxicology panel negative    Cardiovascular    #Acute HFpEF exacerbation  #Type 2 MI  #Hypertension  -Most recent echo 9/21/2023 with EF 50 to 55%, impaired diastolic filling, moderately elevated RVSP  -Troponin peaked at 482, currently down trending  -Continue Lasix 40 mg IV, strict ins/outs. Restart home Lopressor 25 mg BID     Pulmonary:    #Acute hypoxemic, hypercapnic respiratory failure, improving  -CTA chest showing bilateral airspace opacities  -Procal pending, sputum culture, strep pneumo, legionella, mycoplasma antigens ordered  -Continue CAP coverage with Rocephin and Azithromycin, will discontinue solumedrol in the absence of wheezing, continue DuoNebs 3 times daily and BiPAP as needed    GI:  No acute issues    Renal:     #SIXTO, improving  -Likely secondary to volume overload in the setting of HFpEF  -Creatinine on admit 1.08, trending down with diuresis. Monitor RFP    Endocrine:    #Type 2 diabetes  -Takes 90u Lantus nightly at home, will start half of home dose today. Sliding scale insulin,  hemoglobin A1c pending    Heme/Onc:  #Thrombocytopenia  -Platelets 124, will continue to monitor    ID:  #Concern for CAP  -Continue Rocephin and azithromycin, will de-escalate  if Pro-Dave negative  -Culture Data: Pending    Skin/MSK:  No acute issues    ICU CHECK LIST:   Antimicrobials: Rocephin, azithromycin  Oxygen: Nasal cannula, BiPAP as needed  Drips: None  Feeding/Fluids: N.p.o.  Analgesia: Tylenol  Sedation: None  Thromboprophylaxis: Lovenox  Glycemic control: SSI  Bowel care: PRN   Indwelling catheters: Jimenez   Lines: PIVs   Restraints: None  Dispo: MICU   Code Status: Full      Aldair Shafer MD  PGY-1, Internal Medicine    The above note is preliminary pending attestation by attending physician.

## 2024-09-23 NOTE — PROGRESS NOTES
09/23/24 1431   Discharge Planning   Living Arrangements Other (Comment)  (ICF)   Support Systems Children   Assistance Needed Up to wc at [facility]   Type of Residence Nursing home/residential care   Do you have animals or pets at home? No   Home or Post Acute Services Post acute facilities (Rehab/SNF/etc)   Type of Post Acute Facility Services Long term care   Expected Discharge Disposition Inter   Does the patient need discharge transport arranged? Yes   RoundTrip coordination needed? Yes   Has discharge transport been arranged? No   Patient Choice   Provider Choice list and CMS website (https://medicare.gov/care-compare#search) for post-acute Quality and Resource Measure Data were provided and reviewed with: Patient;Other (Comment)  (declined, LTC resident)   Patient / Family choosing to utilize agency / facility established prior to hospitalization Yes     Care transitions assessment completed via bedside with patient. TCC introduced self and explained role. Demographics verified. Patient from [facility].  Patient confirms plan to return at discharge. DSC tasked to send return ICF referral, awaiting bed hold status. TCC to continue to follow for discharge planning needs.      PCP: Per ICF  Last seen: per [facility]  Pharmacy: Per [facility]  Insurance: Medicare, Medicaid   Dispo: RTN [facility], await bed hold status.     LATOSHA JULIEN RN TCC

## 2024-09-23 NOTE — PROGRESS NOTES
Medication Adjustment    The following medication(s) was/were adjusted for Guillermina Bonilla per protocol/policy due to indication for the medication .    Medication(s) adjusted:   Ceftriaxone 1g IV q12h to 2g IV q24h for indication of pneumonia in critically ill patient    Espinoza Pack, [facility]

## 2024-09-23 NOTE — PROGRESS NOTES
Spiritual Care Visit    Clinical Encounter Type  Visited With: Patient  Routine Visit: Introduction  Continue Visiting: No  Referral From:   Jew Encounters  Jew Needs: Prayer  Patient Spiritual Care Encounters  Feelings of Hopelessness: Excellent  Coping: Consistently demonstrated  Social Interaction: 100% of the time  Ms. Arriaza welcomed a spiritual care visit. She talked about her present health crisis. She has a son and 2 grandchildren. She reflected on her life and taj journey. She had questions about theological topics of importance to her. I allowed time and space for gentle, empathic listening and a nonjudgmental presence. I allowed her to direct this patient-centered visit. I consulted with her RN prior to visiting. I offered opportunity for emotional, spiritual support that addressed the connection of body, mind and spirit. Per her request, I prayed and this was meaningful to her. I educated her on how to reach for future requests There are no needs at this time. I remain available upon request. Her outlook was elevated and her spirit was lifted as a result of this visit.  Total time (min.): 40 min.  [facility] Contact #: [phone]

## 2024-09-23 NOTE — H&P
History Of Present Illness  Guillermina Bonilla is a 63 y.o. female presenting with SOB.     Past Medical History  She has a past medical history of Anesthesia of skin, Cervicalgia (2021), Difficulty in walking, not elsewhere classified, Other symptoms and signs involving the musculoskeletal system, Pain in unspecified limb, Personal history of other mental and behavioral disorders, Personal history of other specified conditions, Personal history of other specified conditions, Personal history of other specified conditions, Spinal stenosis, cervical region (2017), Spondylosis without myelopathy or radiculopathy, cervical region (2017), and Xerosis cutis.    Surgical History  She has a past surgical history that includes  section, classic (2014); Other surgical history (2020); Other surgical history (2020); and Cervical fusion (2014).     Social History  She reports that she does not currently use alcohol. She reports that she does not use drugs. No history on file for tobacco use.    Family History  Family History   Problem Relation Name Age of Onset    DAVID disease Mother      Coronary artery disease Father          Allergies  Diclofenac epolamine, Tetracycline, Macrobid [nitrofurantoin monohyd/m-cryst], Prochlorperazine, Adhesive tape-silicones, Amoxicillin-pot clavulanate, Azithromycin, Benzalkonium chloride, Erythromycin, Neomycin-bacitracin-polymyxin, and Nitro    Review of Systems     Physical Exam     Last Recorded Vitals  /82   Pulse 70   Temp 35.6 °C (96.1 °F) (Temporal)   Resp 20   Wt 138 kg (303 lb 2.1 oz)   SpO2 93%     Relevant Results          Forest Celis MD   Physician  Medical Critical Care     H&P      Signed     Date of Service: 2024 10:12 PM     Signed       Expand All Collapse All    Critical Care Medicine:  History & Physical     History of Present Illness      63 y.o. female with a PMH of fibromyalgia, CAD s/p CABG, HFpEF, COPD,  YADIRA, super obese (BMI 56), T2DM presented to the ED from SNF for AMS.  Staff found her lying in bed confused.  Fell last night and has not been acting right since.  EMS contacted and administered Narcan.  No response.  Brought to the ED.  SpO2 76% on room air.  Provided supplemental O2.  Other vitals unimpressive.  ECG negative for acute ST changes.  Labs notable for serum Cr 1.08 (previously 0.6 to 0.7), , troponin 343, WBC 14.2.  VBG 7.18/93/55.  Put on BiPAP.  CT chest demonstrated bibasilar opacities.  Extensive CT imaging otherwise negative for acute process.  Repeat troponin 402.  Repeat VBG on BiPAP  was 7.20/86/37 with FiO2 70%.  Patient treated with ASA, steroids, antibiotics (Rocephin and azithromycin), and Lasix.  Admitted to the ICU.     Arousable to sternal rub.  Only moans, not understandable.  Weakly follows commands, moves all extremities.     Last echo done 2023 demonstrated EF 50-55%, mod pulm HTN (RVSP 39.5 mm Hg).        ROS:  Unable to obtain a complete review of systems due to patient's altered mental status.        Objective  Previous History      Medical History  As above.     Surgical History  Surgical History         Past Surgical History:   Procedure Laterality Date    CERVICAL FUSION   2014     Cervical Vertebral Fusion     SECTION, CLASSIC   2014      Section    OTHER SURGICAL HISTORY   2020     Ankle surgery    OTHER SURGICAL HISTORY   2020     Coronary artery bypass graft         Allergies  RX Allergies         Allergies   Allergen Reactions    Diclofenac Epolamine Hives, Itching and Swelling    Tetracycline Rash    Macrobid [Nitrofurantoin Monohyd/M-Cryst] Unknown    Prochlorperazine Unknown    Adhesive Tape-Silicones Rash    Amoxicillin-Pot Clavulanate Rash    Azithromycin Rash    Benzalkonium Chloride Swelling    Erythromycin Rash    Neomycin-Bacitracin-Polymyxin Swelling    Nitro Rash       Has tolerated NTG SL tsbs             Home Medications       Current Outpatient Medications   Medication Instructions    acetaminophen (TYLENOL) 650 mg, oral, Every 6 hours PRN    albuterol 90 mcg/actuation inhaler 2 puffs, inhalation, Every 6 hours PRN    albuterol 2.5 mg, nebulization, Every 4 hours PRN    alum-mag hydroxide-simeth (Maalox MAX) 400-400-40 mg/5 mL suspension 30 mL, oral, Every 6 hours PRN    atorvastatin (LIPITOR) 40 mg, oral, Nightly    bisacodyl (DULCOLAX) 10 mg, rectal, Daily PRN    cholecalciferol (VITAMIN D-3) 2,000 Units, oral, Nightly    cyanocobalamin (VITAMIN B-12) 500 mcg, oral, Nightly    DULoxetine (CYMBALTA) 60 mg, oral, Daily, With 30mg capsule    DULoxetine (CYMBALTA) 30 mg, oral, Daily, With 60mg capsule    gabapentin (NEURONTIN) 600 mg, oral, 3 times daily    HumaLOG KwikPen Insulin 50 Units, subcutaneous, 3 times daily, Plus sliding scale. Hold for BS<200    ibuprofen 600 mg, oral, Every 6 hours PRN    insulin glargine-yfgn (SEMGLEE(INSULIN GLARG-YFGN)PEN) 90 Units, subcutaneous, Nightly    insulin lispro (HUMALOG KWIKPEN INSULIN) 0-14 Units, subcutaneous, 3 times daily, Per sliding scale: <60 = notify MD, 151-200 = 2 units, 201-250 = 4 units, 251-300 = 6 units, 301-350 = 8 units, 351-400 = 10 units, 401-450 = 12 units, 451-500 = 14 units, >500 = notify MD    lidocaine 4 % patch 2 patches, transdermal, Daily PRN, To bach and chest. Remove & discard patch within 12 hours or as directed by MD.    magnesium hydroxide (Milk of Magnesia) 400 mg/5 mL suspension 30 mL, oral, Daily PRN    melatonin 3 mg tablet 1 tablet, oral, Nightly    menthol 5.4 mg lozenge 1 drop, mucous membrane, Every 4 hours PRN    metoprolol tartrate (Lopressor) 25 mg tablet 1 tablet, oral, 2 times daily    omeprazole (PriLOSEC) 20 mg DR capsule 1 capsule, oral, 2 times daily    ondansetron (ZOFRAN) 4 mg, oral, Every 12 hours PRN    oxybutynin XL (DITROPAN-XL) 15 mg, oral, Daily, Do not crush, chew, or split.    polyethylene glycol (MIRALAX) 17 g,  oral, Daily PRN    sodium phosphates (Fleet Enema) 19-7 gram/118 mL enema enema 118 mL, rectal, Daily PRN    tiZANidine (ZANAFLEX) 2 mg, oral, Every 8 hours PRN    traZODone (DESYREL) 150 mg, oral, Nightly      Social History  History - Tobacco Use - Plain Text   Social History          Tobacco Use   Smoking Status Not on file   Smokeless Tobacco Not on file          reports that she does not currently use alcohol.    reports no history of drug use.      Family History  family history includes Coronary artery disease in her father; DAVID disease in her mother.     Objective      Vitals       Vitals:     09/22/24 1730   Weight: 150 kg (330 lb 11 oz)      Body mass index is 56.76 kg/m².          9/22/2024     7:08 PM 9/22/2024     7:09 PM 9/22/2024     7:12 PM 9/22/2024     7:15 PM 9/22/2024     7:30 PM 9/22/2024     8:35 PM 9/22/2024     9:00 PM   Vitals   Systolic 116       109 102 104   Diastolic 68       66 57 58   Heart Rate 73 78 70 76 77 74 76   Temp 36.5 °C (97.7 °F)       36.2 °C (97.2 °F)       Resp 27 27 20 20 20 20 23      Vent settings:  FiO2 (%):  [70 %-100 %] 70 %  S RR:  [14-20] 20  No intake or output data in the 24 hours ending 09/22/24 2212     Physical Exam  Neuro:   See HPI.  Eyes:PERRL, clear sclerae.  CV:        Normal S1, S2.  RRR.  No m/r/g.  Resp:     Diminished.  GI:+BS, abd soft, NT, ND.  Ext:        Trace pitting BLE edema.  Skin:Warm and dry.     Medications      Scheduled Medications:     Scheduled Medications   aspirin, 324 mg, oral, Once  furosemide, 40 mg, intravenous, Once  ondansetron, 4 mg, intravenous, Once         Continuous Medications:   Continuous Medications          PRN Medications:      Labs           Results from last 72 hours   Lab Units 09/22/24  1742   GLUCOSE mg/dL 190*   SODIUM mmol/L 140   POTASSIUM mmol/L 5.1   CHLORIDE mmol/L 103   CO2 mmol/L 30   BUN mg/dL 18   CREATININE mg/dL 1.08*   EGFR mL/min/1.73m*2 57*   CALCIUM mg/dL 8.6   ALBUMIN g/dL 3.8   MAGNESIUM mg/dL  "2.19   PHOSPHORUS mg/dL 6.4*           Results from last 72 hours   Lab Units 09/22/24  1742   ALK PHOS U/L 67   ALT U/L 14   AST U/L 27   BILIRUBIN TOTAL mg/dL 0.3   PROTEIN TOTAL g/dL 6.6            Results from last 72 hours   Lab Units 09/22/24  1859 09/22/24  1742   TROPHS ng/L 402* 343*               Results from last 72 hours   Lab Units 09/22/24  1742   WBC AUTO x10*3/uL 14.2*   NRBC AUTO /100 WBCs 0.1*   RBC AUTO x10*6/uL 4.09   HEMOGLOBIN g/dL 12.6   HEMATOCRIT % 40.1   MCV fL 98   MCH pg 30.8   MCHC g/dL 31.4*   RDW % 13.2   PLATELETS AUTO x10*3/uL 133*             Results from last 72 hours   Lab Units 09/22/24  2017 09/22/24  1859 09/22/24  1742   FIO2 % 70 70 100             Lab Results   Component Value Date     GRAMSTAIN   09/20/2023       THE GRAM STAIN INDICATES THAT THE SPECIMEN CONTAINS~SIGNIFICANT SALIVARY CONTAMINATION.~THE CULTURE WILL NOT BE PROCESSED AS IT WILL BE OF LIMITED~DIAGNOSTIC VALUE. A SPECIMEN OF BETTER QUALITY SHOULD BE~SUBMITTED IF CLINICALLY INDICATED.      No results found for: \"URINECULTURE\"     Imaging and Diagnostic Studies      Recent imaging and diagnostic studies reviewed.           Assessment / Plan      Acute metabolic encephalopathy  Acute on chronic diastolic CHF  Acute hypoxemic and hypercapnic respiratory failure  COPD exacerbation?  Acute MI, type 2  T2DM  Leukocytosis, possible CAP     -Continue NIPPV.  Start nebulized bronchodilators, steroids.  Additional Lasix.  -Likely type 2 MI in setting of respiratory failure.  Conservative management.  -Sliding scale insulin.  -Continue course of antibiotics for now with Rocephin and azithromycin.                              Assessment/Plan   Assessment & Plan  Acute hypercapnic respiratory failure (Multi)             Armando De Oliveira MD    "

## 2024-09-23 NOTE — CARE PLAN
The clinical goals for the shift include pt to remain HDS, tolerate bi pap this shift      Problem: Pain - Adult  Goal: Verbalizes/displays adequate comfort level or baseline comfort level  9/23/2024 0509 by Justin Almonte RN  Outcome: Progressing  9/23/2024 0508 by Justin Almonte RN  Outcome: Not Progressing     Problem: Safety - Adult  Goal: Free from fall injury  9/23/2024 0509 by Justin Almonte RN  Outcome: Progressing  9/23/2024 0508 by Justin Almonte RN  Outcome: Not Progressing     Problem: Discharge Planning  Goal: Discharge to home or other facility with appropriate resources  9/23/2024 0509 by Justin Almonte RN  Outcome: Progressing  9/23/2024 0508 by Justin Almonte RN  Outcome: Not Progressing     Problem: Chronic Conditions and Co-morbidities  Goal: Patient's chronic conditions and co-morbidity symptoms are monitored and maintained or improved  9/23/2024 0509 by Justin Almonte RN  Outcome: Progressing  9/23/2024 0508 by Justin Almonte RN  Outcome: Not Progressing     Problem: Skin  Goal: Decreased wound size/increased tissue granulation at next dressing change  9/23/2024 0509 by Justin Almonte RN  Outcome: Progressing  9/23/2024 0508 by Justin Almonte RN  Outcome: Not Progressing  Flowsheets (Taken 9/23/2024 0508)  Decreased wound size/increased tissue granulation at next dressing change:   Promote sleep for wound healing   Protective dressings over bony prominences  Goal: Participates in plan/prevention/treatment measures  9/23/2024 0509 by Justin Almonte RN  Outcome: Progressing  9/23/2024 0508 by Justin Almonte RN  Outcome: Not Progressing  Flowsheets (Taken 9/23/2024 0508)  Participates in plan/prevention/treatment measures: Elevate heels  Goal: Prevent/manage excess moisture  9/23/2024 0509 by Justin Almonet RN  Outcome: Progressing  9/23/2024 0508 by Justin Almonte RN  Outcome: Not Progressing  Goal:  Prevent/minimize sheer/friction injuries  9/23/2024 0509 by Justin Almonte RN  Outcome: Progressing  9/23/2024 0508 by Justin Almonte RN  Outcome: Not Progressing  Flowsheets (Taken 9/23/2024 0508)  Prevent/minimize sheer/friction injuries:   Use pull sheet   HOB 30 degrees or less   Turn/reposition every 2 hours/use positioning/transfer devices  Goal: Promote/optimize nutrition  9/23/2024 0509 by Justin Almonte RN  Outcome: Progressing  9/23/2024 0508 by Justin Almonte RN  Outcome: Not Progressing  Goal: Promote skin healing  9/23/2024 0509 by Justin Almonte RN  Outcome: Progressing  9/23/2024 0508 by Justin Almonte RN  Outcome: Not Progressing  Flowsheets (Taken 9/23/2024 0508)  Promote skin healing:   Assess skin/pad under line(s)/device(s)   Protective dressings over bony prominences   Turn/reposition every 2 hours/use positioning/transfer devices

## 2024-09-23 NOTE — CARE PLAN
The clinical goals for the shift include pt to remain HDS, tolerate bi pap this shift        Problem: Pain - Adult  Goal: Verbalizes/displays adequate comfort level or baseline comfort level  Outcome: Not Progressing     Problem: Safety - Adult  Goal: Free from fall injury  Outcome: Not Progressing     Problem: Discharge Planning  Goal: Discharge to home or other facility with appropriate resources  Outcome: Not Progressing     Problem: Chronic Conditions and Co-morbidities  Goal: Patient's chronic conditions and co-morbidity symptoms are monitored and maintained or improved  Outcome: Not Progressing     Problem: Skin  Goal: Decreased wound size/increased tissue granulation at next dressing change  Outcome: Not Progressing  Flowsheets (Taken 9/23/2024 0508)  Decreased wound size/increased tissue granulation at next dressing change:   Promote sleep for wound healing   Protective dressings over bony prominences  Goal: Participates in plan/prevention/treatment measures  Outcome: Not Progressing  Flowsheets (Taken 9/23/2024 0508)  Participates in plan/prevention/treatment measures: Elevate heels  Goal: Prevent/manage excess moisture  Outcome: Not Progressing  Goal: Prevent/minimize sheer/friction injuries  Outcome: Not Progressing  Flowsheets (Taken 9/23/2024 0508)  Prevent/minimize sheer/friction injuries:   Use pull sheet   HOB 30 degrees or less   Turn/reposition every 2 hours/use positioning/transfer devices  Goal: Promote/optimize nutrition  Outcome: Not Progressing  Goal: Promote skin healing  Outcome: Not Progressing  Flowsheets (Taken 9/23/2024 0508)  Promote skin healing:   Assess skin/pad under line(s)/device(s)   Protective dressings over bony prominences   Turn/reposition every 2 hours/use positioning/transfer devices

## 2024-09-23 NOTE — H&P
Critical Care Medicine:  History & Physical    History of Present Illness     63 y.o. female with a PMH of fibromyalgia, CAD s/p CABG, HFpEF, COPD, YADIRA, super obese (BMI 56), T2DM presented to the ED from SNF for AMS.  Staff found her lying in bed confused.  Fell last night and has not been acting right since.  EMS contacted and administered Narcan.  No response.  Brought to the ED.  SpO2 76% on room air.  Provided supplemental O2.  Other vitals unimpressive.  ECG negative for acute ST changes.  Labs notable for serum Cr 1.08 (previously 0.6 to 0.7), , troponin 343, WBC 14.2.  VBG 7.18/93/55.  Put on BiPAP.  CT chest demonstrated bibasilar opacities.  Extensive CT imaging otherwise negative for acute process.  Repeat troponin 402.  Repeat VBG on BiPAP  was 7.20/86/37 with FiO2 70%.  Patient treated with ASA, steroids, antibiotics (Rocephin and azithromycin), and Lasix.  Admitted to the ICU.    Arousable to sternal rub.  Only moans, not understandable.  Weakly follows commands, moves all extremities.    Last echo done 2023 demonstrated EF 50-55%, mod pulm HTN (RVSP 39.5 mm Hg).      ROS:  Unable to obtain a complete review of systems due to patient's altered mental status.    Objective   Previous History     Medical History  As above.    Surgical History  Past Surgical History:   Procedure Laterality Date    CERVICAL FUSION  2014    Cervical Vertebral Fusion     SECTION, CLASSIC  2014     Section    OTHER SURGICAL HISTORY  2020    Ankle surgery    OTHER SURGICAL HISTORY  2020    Coronary artery bypass graft     Allergies  Allergies   Allergen Reactions    Diclofenac Epolamine Hives, Itching and Swelling    Tetracycline Rash    Macrobid [Nitrofurantoin Monohyd/M-Cryst] Unknown    Prochlorperazine Unknown    Adhesive Tape-Silicones Rash    Amoxicillin-Pot Clavulanate Rash    Azithromycin Rash    Benzalkonium Chloride Swelling    Erythromycin Rash     Neomycin-Bacitracin-Polymyxin Swelling    Nitro Rash     Has tolerated NTG SL tsbs       Home Medications  Current Outpatient Medications   Medication Instructions    acetaminophen (TYLENOL) 650 mg, oral, Every 6 hours PRN    albuterol 90 mcg/actuation inhaler 2 puffs, inhalation, Every 6 hours PRN    albuterol 2.5 mg, nebulization, Every 4 hours PRN    alum-mag hydroxide-simeth (Maalox MAX) 400-400-40 mg/5 mL suspension 30 mL, oral, Every 6 hours PRN    atorvastatin (LIPITOR) 40 mg, oral, Nightly    bisacodyl (DULCOLAX) 10 mg, rectal, Daily PRN    cholecalciferol (VITAMIN D-3) 2,000 Units, oral, Nightly    cyanocobalamin (VITAMIN B-12) 500 mcg, oral, Nightly    DULoxetine (CYMBALTA) 60 mg, oral, Daily, With 30mg capsule    DULoxetine (CYMBALTA) 30 mg, oral, Daily, With 60mg capsule    gabapentin (NEURONTIN) 600 mg, oral, 3 times daily    HumaLOG KwikPen Insulin 50 Units, subcutaneous, 3 times daily, Plus sliding scale. Hold for BS<200    ibuprofen 600 mg, oral, Every 6 hours PRN    insulin glargine-yfgn (SEMGLEE(INSULIN GLARG-YFGN)PEN) 90 Units, subcutaneous, Nightly    insulin lispro (HUMALOG KWIKPEN INSULIN) 0-14 Units, subcutaneous, 3 times daily, Per sliding scale: <60 = notify MD, 151-200 = 2 units, 201-250 = 4 units, 251-300 = 6 units, 301-350 = 8 units, 351-400 = 10 units, 401-450 = 12 units, 451-500 = 14 units, >500 = notify MD    lidocaine 4 % patch 2 patches, transdermal, Daily PRN, To bach and chest. Remove & discard patch within 12 hours or as directed by MD.    magnesium hydroxide (Milk of Magnesia) 400 mg/5 mL suspension 30 mL, oral, Daily PRN    melatonin 3 mg tablet 1 tablet, oral, Nightly    menthol 5.4 mg lozenge 1 drop, mucous membrane, Every 4 hours PRN    metoprolol tartrate (Lopressor) 25 mg tablet 1 tablet, oral, 2 times daily    omeprazole (PriLOSEC) 20 mg DR capsule 1 capsule, oral, 2 times daily    ondansetron (ZOFRAN) 4 mg, oral, Every 12 hours PRN    oxybutynin XL (DITROPAN-XL) 15 mg,  oral, Daily, Do not crush, chew, or split.    polyethylene glycol (MIRALAX) 17 g, oral, Daily PRN    sodium phosphates (Fleet Enema) 19-7 gram/118 mL enema enema 118 mL, rectal, Daily PRN    tiZANidine (ZANAFLEX) 2 mg, oral, Every 8 hours PRN    traZODone (DESYREL) 150 mg, oral, Nightly     Social History  Social History     Tobacco Use   Smoking Status Not on file   Smokeless Tobacco Not on file       reports that she does not currently use alcohol.    reports no history of drug use.     Family History  family history includes Coronary artery disease in her father; DAVID disease in her mother.    Objective     Vitals:    09/22/24 1730   Weight: 150 kg (330 lb 11 oz)   Body mass index is 56.76 kg/m².        9/22/2024     7:08 PM 9/22/2024     7:09 PM 9/22/2024     7:12 PM 9/22/2024     7:15 PM 9/22/2024     7:30 PM 9/22/2024     8:35 PM 9/22/2024     9:00 PM   Vitals   Systolic 116    109 102 104   Diastolic 68    66 57 58   Heart Rate 73 78 70 76 77 74 76   Temp 36.5 °C (97.7 °F)    36.2 °C (97.2 °F)     Resp 27 27 20 20 20 20 23        Vent settings:  FiO2 (%):  [70 %-100 %] 70 %  S RR:  [14-20] 20  No intake or output data in the 24 hours ending 09/22/24 2212    Physical Exam  Neuro: See HPI.  Eyes: PERRL, clear sclerae.  CV: Normal S1, S2.  RRR.  No m/r/g.  Resp: Diminished.  GI: +BS, abd soft, NT, ND.  Ext: Trace pitting BLE edema.  Skin: Warm and dry.    Medications     Scheduled Medications:   aspirin, 324 mg, oral, Once  furosemide, 40 mg, intravenous, Once  ondansetron, 4 mg, intravenous, Once       Continuous Medications:       PRN Medications:     Labs     Results from last 72 hours   Lab Units 09/22/24  1742   GLUCOSE mg/dL 190*   SODIUM mmol/L 140   POTASSIUM mmol/L 5.1   CHLORIDE mmol/L 103   CO2 mmol/L 30   BUN mg/dL 18   CREATININE mg/dL 1.08*   EGFR mL/min/1.73m*2 57*   CALCIUM mg/dL 8.6   ALBUMIN g/dL 3.8   MAGNESIUM mg/dL 2.19   PHOSPHORUS mg/dL 6.4*     Results from last 72 hours   Lab Units  "09/22/24  1742   ALK PHOS U/L 67   ALT U/L 14   AST U/L 27   BILIRUBIN TOTAL mg/dL 0.3   PROTEIN TOTAL g/dL 6.6     Results from last 72 hours   Lab Units 09/22/24 1859 09/22/24  1742   TROPHS ng/L 402* 343*         Results from last 72 hours   Lab Units 09/22/24  1742   WBC AUTO x10*3/uL 14.2*   NRBC AUTO /100 WBCs 0.1*   RBC AUTO x10*6/uL 4.09   HEMOGLOBIN g/dL 12.6   HEMATOCRIT % 40.1   MCV fL 98   MCH pg 30.8   MCHC g/dL 31.4*   RDW % 13.2   PLATELETS AUTO x10*3/uL 133*     Results from last 72 hours   Lab Units 09/22/24  2017 09/22/24 1859 09/22/24  1742   FIO2 % 70 70 100     Lab Results   Component Value Date    GRAMSTAIN  09/20/2023     THE GRAM STAIN INDICATES THAT THE SPECIMEN CONTAINS~SIGNIFICANT SALIVARY CONTAMINATION.~THE CULTURE WILL NOT BE PROCESSED AS IT WILL BE OF LIMITED~DIAGNOSTIC VALUE. A SPECIMEN OF BETTER QUALITY SHOULD BE~SUBMITTED IF CLINICALLY INDICATED.     No results found for: \"URINECULTURE\"    Imaging and Diagnostic Studies     Recent imaging and diagnostic studies reviewed.        Assessment / Plan     Acute metabolic encephalopathy  Acute on chronic diastolic CHF  Acute hypoxemic and hypercapnic respiratory failure  COPD exacerbation?  Acute MI, type 2  T2DM  Leukocytosis, possible CAP    -Continue NIPPV.  Start nebulized bronchodilators, steroids.  Additional Lasix.  -Likely type 2 MI in setting of respiratory failure.  Conservative management.  -Sliding scale insulin.  -Continue course of antibiotics for now with Rocephin and azithromycin.      Forest Celis MD    This patient is critically ill/injured due to acute impairment in one or more vital organ systems, such that there is a probably of imminent or life-threatening deterioration of the patient's condition.  I spent 44 minutes in the direct delivery of medical care that involves high complexity decision making to treat single or multiple vital organ system failure and/or prevent further life-threatening deterioration of the " patient's condition.  This time does not include separately billable procedures.

## 2024-09-23 NOTE — PROGRESS NOTES
Guillermina Bonilla is a 63 y.o. female on day 1 of admission presenting with Acute hypercapnic respiratory failure (Multi).      Subjective   Patient fully evaluated 9/23, resting in bed with HOB elevated and still requiring continuous cardiac and pulse oximetry monitoring due to complex commodities and potential for rapid decline. Patient with   HTN, HLD, DM2, HFpEF, YADIRA, obesity presenting with AMS/fall at SNF with acute on chronic resp failure with hypoxia/hypercapnia due to CAP and pulmonary edema, possible COPD exacerbation. Patient on supplemental oxygen via NC with plan to resume BiPAP for CO2 retention as tolerated. Patient awake, slow to respond with slight confusion noted, continue to monitor mentation. medications and labs reviewed, continue current IV antibiotics  awaiting blood cultures.  and repeat labs in the AM. Patient aware and agreeable to current plan, continue plan as above. I spent 50 minutes in the professional and overall care of this patient.       Objective     Last Recorded Vitals  /77   Pulse 74   Temp 35.4 °C (95.7 °F) (Temporal)   Resp 25   Wt 137 kg (302 lb 0.5 oz)   SpO2 92%   Intake/Output last 3 Shifts:    Intake/Output Summary (Last 24 hours) at 9/23/2024 1244  Last data filed at 9/23/2024 1200  Gross per 24 hour   Intake 220 ml   Output 2405 ml   Net -2185 ml       Admission Weight  Weight: 150 kg (330 lb 11 oz) (09/22/24 1730)    Daily Weight  09/23/24 : 137 kg (302 lb 0.5 oz)    Image Results  ECG 12 lead  Sinus rhythm  Probable left atrial enlargement  Anteroseptal infarct, age indeterminate      Physical Exam    Relevant Results               Assessment/Plan                     Expand All Collapse All    Critical Care Medicine:  History & Physical     History of Present Illness      63 y.o. female with a PMH of fibromyalgia, CAD s/p CABG, HFpEF, COPD, YADIRA, super obese (BMI 56), T2DM presented to the ED from SNF for AMS.  Staff found her lying in bed confused.  Fell  last night and has not been acting right since.  EMS contacted and administered Narcan.  No response.  Brought to the ED.  SpO2 76% on room air.  Provided supplemental O2.  Other vitals unimpressive.  ECG negative for acute ST changes.  Labs notable for serum Cr 1.08 (previously 0.6 to 0.7), , troponin 343, WBC 14.2.  VBG 7.18/93/55.  Put on BiPAP.  CT chest demonstrated bibasilar opacities.  Extensive CT imaging otherwise negative for acute process.  Repeat troponin 402.  Repeat VBG on BiPAP  was 7.20/86/37 with FiO2 70%.  Patient treated with ASA, steroids, antibiotics (Rocephin and azithromycin), and Lasix.  Admitted to the ICU.     Arousable to sternal rub.  Only moans, not understandable.  Weakly follows commands, moves all extremities.     Last echo done 2023 demonstrated EF 50-55%, mod pulm HTN (RVSP 39.5 mm Hg).        ROS:  Unable to obtain a complete review of systems due to patient's altered mental status.        Objective  Previous History      Medical History  As above.     Surgical History  Surgical History         Past Surgical History:   Procedure Laterality Date    CERVICAL FUSION   2014     Cervical Vertebral Fusion     SECTION, CLASSIC   2014      Section    OTHER SURGICAL HISTORY   2020     Ankle surgery    OTHER SURGICAL HISTORY   2020     Coronary artery bypass graft         Allergies  RX Allergies         Allergies   Allergen Reactions    Diclofenac Epolamine Hives, Itching and Swelling    Tetracycline Rash    Macrobid [Nitrofurantoin Monohyd/M-Cryst] Unknown    Prochlorperazine Unknown    Adhesive Tape-Silicones Rash    Amoxicillin-Pot Clavulanate Rash    Azithromycin Rash    Benzalkonium Chloride Swelling    Erythromycin Rash    Neomycin-Bacitracin-Polymyxin Swelling    Nitro Rash       Has tolerated NTG SL tsbs            Home Medications       Current Outpatient Medications   Medication Instructions    acetaminophen (TYLENOL) 650 mg,  oral, Every 6 hours PRN    albuterol 90 mcg/actuation inhaler 2 puffs, inhalation, Every 6 hours PRN    albuterol 2.5 mg, nebulization, Every 4 hours PRN    alum-mag hydroxide-simeth (Maalox MAX) 400-400-40 mg/5 mL suspension 30 mL, oral, Every 6 hours PRN    atorvastatin (LIPITOR) 40 mg, oral, Nightly    bisacodyl (DULCOLAX) 10 mg, rectal, Daily PRN    cholecalciferol (VITAMIN D-3) 2,000 Units, oral, Nightly    cyanocobalamin (VITAMIN B-12) 500 mcg, oral, Nightly    DULoxetine (CYMBALTA) 60 mg, oral, Daily, With 30mg capsule    DULoxetine (CYMBALTA) 30 mg, oral, Daily, With 60mg capsule    gabapentin (NEURONTIN) 600 mg, oral, 3 times daily    HumaLOG KwikPen Insulin 50 Units, subcutaneous, 3 times daily, Plus sliding scale. Hold for BS<200    ibuprofen 600 mg, oral, Every 6 hours PRN    insulin glargine-yfgn (SEMGLEE(INSULIN GLARG-YFGN)PEN) 90 Units, subcutaneous, Nightly    insulin lispro (HUMALOG KWIKPEN INSULIN) 0-14 Units, subcutaneous, 3 times daily, Per sliding scale: <60 = notify MD, 151-200 = 2 units, 201-250 = 4 units, 251-300 = 6 units, 301-350 = 8 units, 351-400 = 10 units, 401-450 = 12 units, 451-500 = 14 units, >500 = notify MD    lidocaine 4 % patch 2 patches, transdermal, Daily PRN, To bach and chest. Remove & discard patch within 12 hours or as directed by MD.    magnesium hydroxide (Milk of Magnesia) 400 mg/5 mL suspension 30 mL, oral, Daily PRN    melatonin 3 mg tablet 1 tablet, oral, Nightly    menthol 5.4 mg lozenge 1 drop, mucous membrane, Every 4 hours PRN    metoprolol tartrate (Lopressor) 25 mg tablet 1 tablet, oral, 2 times daily    omeprazole (PriLOSEC) 20 mg DR capsule 1 capsule, oral, 2 times daily    ondansetron (ZOFRAN) 4 mg, oral, Every 12 hours PRN    oxybutynin XL (DITROPAN-XL) 15 mg, oral, Daily, Do not crush, chew, or split.    polyethylene glycol (MIRALAX) 17 g, oral, Daily PRN    sodium phosphates (Fleet Enema) 19-7 gram/118 mL enema enema 118 mL, rectal, Daily PRN    tiZANidine  (ZANAFLEX) 2 mg, oral, Every 8 hours PRN    traZODone (DESYREL) 150 mg, oral, Nightly      Social History  History - Tobacco Use - Plain Text   Social History          Tobacco Use   Smoking Status Not on file   Smokeless Tobacco Not on file          reports that she does not currently use alcohol.    reports no history of drug use.      Family History  family history includes Coronary artery disease in her father; DAVID disease in her mother.     Objective      Vitals       Vitals:     09/22/24 1730   Weight: 150 kg (330 lb 11 oz)      Body mass index is 56.76 kg/m².          9/22/2024     7:08 PM 9/22/2024     7:09 PM 9/22/2024     7:12 PM 9/22/2024     7:15 PM 9/22/2024     7:30 PM 9/22/2024     8:35 PM 9/22/2024     9:00 PM   Vitals   Systolic 116       109 102 104   Diastolic 68       66 57 58   Heart Rate 73 78 70 76 77 74 76   Temp 36.5 °C (97.7 °F)       36.2 °C (97.2 °F)       Resp 27 27 20 20 20 20 23      Vent settings:  FiO2 (%):  [70 %-100 %] 70 %  S RR:  [14-20] 20  No intake or output data in the 24 hours ending 09/22/24 2212     Physical Exam  Neuro:   See HPI.  Eyes:PERRL, clear sclerae.  CV:        Normal S1, S2.  RRR.  No m/r/g.  Resp:     Diminished.  GI:+BS, abd soft, NT, ND.  Ext:        Trace pitting BLE edema.  Skin:Warm and dry.     Medications      Scheduled Medications:     Scheduled Medications   aspirin, 324 mg, oral, Once  furosemide, 40 mg, intravenous, Once  ondansetron, 4 mg, intravenous, Once         Continuous Medications:   Continuous Medications          PRN Medications:      Labs           Results from last 72 hours   Lab Units 09/22/24  1742   GLUCOSE mg/dL 190*   SODIUM mmol/L 140   POTASSIUM mmol/L 5.1   CHLORIDE mmol/L 103   CO2 mmol/L 30   BUN mg/dL 18   CREATININE mg/dL 1.08*   EGFR mL/min/1.73m*2 57*   CALCIUM mg/dL 8.6   ALBUMIN g/dL 3.8   MAGNESIUM mg/dL 2.19   PHOSPHORUS mg/dL 6.4*           Results from last 72 hours   Lab Units 09/22/24  1742   ALK PHOS U/L 67   ALT U/L  "14   AST U/L 27   BILIRUBIN TOTAL mg/dL 0.3   PROTEIN TOTAL g/dL 6.6            Results from last 72 hours   Lab Units 09/22/24 1859 09/22/24  1742   TROPHS ng/L 402* 343*               Results from last 72 hours   Lab Units 09/22/24  1742   WBC AUTO x10*3/uL 14.2*   NRBC AUTO /100 WBCs 0.1*   RBC AUTO x10*6/uL 4.09   HEMOGLOBIN g/dL 12.6   HEMATOCRIT % 40.1   MCV fL 98   MCH pg 30.8   MCHC g/dL 31.4*   RDW % 13.2   PLATELETS AUTO x10*3/uL 133*             Results from last 72 hours   Lab Units 09/22/24  2017 09/22/24  1859 09/22/24  1742   FIO2 % 70 70 100             Lab Results   Component Value Date     GRAMSTAIN   09/20/2023       THE GRAM STAIN INDICATES THAT THE SPECIMEN CONTAINS~SIGNIFICANT SALIVARY CONTAMINATION.~THE CULTURE WILL NOT BE PROCESSED AS IT WILL BE OF LIMITED~DIAGNOSTIC VALUE. A SPECIMEN OF BETTER QUALITY SHOULD BE~SUBMITTED IF CLINICALLY INDICATED.      No results found for: \"URINECULTURE\"     Imaging and Diagnostic Studies      Recent imaging and diagnostic studies reviewed.           Assessment / Plan      Acute metabolic encephalopathy  Acute on chronic diastolic CHF  Acute hypoxemic and hypercapnic respiratory failure  COPD exacerbation?  Acute MI, type 2  T2DM  Leukocytosis, possible CAP     -Continue NIPPV.  Start nebulized bronchodilators, steroids.  Additional Lasix.  -Likely type 2 MI in setting of respiratory failure.  Conservative management.  -Sliding scale insulin.  -Continue course of antibiotics for now with Rocephin and azithromycin.                    This patient has a urinary catheter   Reason for the urinary catheter remaining today?     Assessment & Plan  Acute hypercapnic respiratory failure (Multi)    Patient fully evaluated 9/23, resting in bed with HOB elevated and still requiring continuous cardiac and pulse oximetry monitoring due to complex commodities and potential for rapid decline. Patient with   HTN, HLD, DM2, HFpEF, YADIRA, obesity presenting with AMS/fall at SNF " with acute on chronic resp failure with hypoxia/hypercapnia due to CAP and pulmonary edema, possible COPD exacerbation. Patient on supplemental oxygen via NC with plan to resume BiPAP for CO2 retention as tolerated. Patient awake, slow to respond with slight confusion noted, continue to monitor mentation. medications and labs reviewed, continue current IV antibiotics awaiting blood cultures.  and repeat labs in the AM. Patient aware and agreeable to current plan, continue plan as above. I spent 50 minutes in the professional and overall care of this patient.              Felicia Hernadez

## 2024-09-23 NOTE — SIGNIFICANT EVENT
HTN, HLD, DM2, HFpEF, YADIRA, obesity presenting with AMS/fall at SNF with acute on chronic resp failure with hypoxia/hypercapnia due to CAP and pulmonary edema, possible COPD exacerbation    #CAP  #Acute on chronic resp failure with hypoxia/hypercapnia  #Acute pulmonary edema  #COPD not in exacerbation - no wheezing and moving air on exam  -CTX/azithro  -procal, s pneumo, legionella antigens  -diuresis with lasix  -dc steroids, no sign of COPD exacerbation  -duoneb tid    #HFpEF with exacerbation  #PH - likely groups 2 and 3  -continue diuresis, goal -1L    #Acute metabolic encephalopathy d/t hypercapnia. Improving  -Monitor mentation off bipap    #SIXTO - likely cardiorenal  -improving with diuresis, continue to trend    #DM2  -basal/bolus insulin  -Goal glucose 140-180 in ICU    #Fall at home - negative extensive trauma survey/cts  -PRN pain control    Lines/tubes: PIV  Prophylaxis: lovenox   Drips: none  Fluids: PRN  Oxygen: prn  Nutrition: npo, can eat if resp status is stable off bipap  Restraints: none  Code status: full code  Dispo: ICU

## 2024-09-23 NOTE — CARE PLAN
Problem: Pain - Adult  Goal: Verbalizes/displays adequate comfort level or baseline comfort level  Outcome: Progressing     Problem: Safety - Adult  Goal: Free from fall injury  Outcome: Progressing     Problem: Discharge Planning  Goal: Discharge to home or other facility with appropriate resources  Outcome: Progressing     Problem: Chronic Conditions and Co-morbidities  Goal: Patient's chronic conditions and co-morbidity symptoms are monitored and maintained or improved  Outcome: Progressing     Problem: Skin  Goal: Decreased wound size/increased tissue granulation at next dressing change  Outcome: Progressing  Flowsheets (Taken 9/23/2024 1014)  Decreased wound size/increased tissue granulation at next dressing change:   Promote sleep for wound healing   Protective dressings over bony prominences  Goal: Participates in plan/prevention/treatment measures  Outcome: Progressing  Flowsheets (Taken 9/23/2024 1014)  Participates in plan/prevention/treatment measures:   Discuss with provider PT/OT consult   Elevate heels  Goal: Prevent/manage excess moisture  Outcome: Progressing  Flowsheets (Taken 9/23/2024 1014)  Prevent/manage excess moisture:   Cleanse incontinence/protect with barrier cream   Moisturize dry skin  Goal: Prevent/minimize sheer/friction injuries  Outcome: Progressing  Flowsheets (Taken 9/23/2024 1014)  Prevent/minimize sheer/friction injuries:   Use pull sheet   HOB 30 degrees or less   Turn/reposition every 2 hours/use positioning/transfer devices  Goal: Promote/optimize nutrition  Outcome: Progressing  Flowsheets (Taken 9/23/2024 1014)  Promote/optimize nutrition: Monitor/record intake including meals  Goal: Promote skin healing  Outcome: Progressing  Flowsheets (Taken 9/23/2024 1014)  Promote skin healing:   Turn/reposition every 2 hours/use positioning/transfer devices   Protective dressings over bony prominences    The clinical goals for the shift include Hemodynamic Stability

## 2024-09-24 ENCOUNTER — APPOINTMENT (OUTPATIENT)
Dept: RADIOLOGY | Facility: HOSPITAL | Age: 64
DRG: 280 | End: 2024-09-24
Payer: MEDICARE

## 2024-09-24 VITALS
HEART RATE: 62 BPM | TEMPERATURE: 97.9 F | OXYGEN SATURATION: 97 % | SYSTOLIC BLOOD PRESSURE: 151 MMHG | HEIGHT: 64 IN | BODY MASS INDEX: 50.02 KG/M2 | DIASTOLIC BLOOD PRESSURE: 72 MMHG | RESPIRATION RATE: 17 BRPM | WEIGHT: 293 LBS

## 2024-09-24 LAB
ALBUMIN SERPL BCP-MCNC: 3.5 G/DL (ref 3.4–5)
ALP SERPL-CCNC: 63 U/L (ref 33–136)
ALT SERPL W P-5'-P-CCNC: 19 U/L (ref 7–45)
ANION GAP SERPL CALC-SCNC: 12 MMOL/L (ref 10–20)
AST SERPL W P-5'-P-CCNC: 16 U/L (ref 9–39)
ATRIAL RATE: 72 BPM
BASOPHILS # BLD AUTO: 0.01 X10*3/UL (ref 0–0.1)
BASOPHILS NFR BLD AUTO: 0.1 %
BILIRUB SERPL-MCNC: 0.4 MG/DL (ref 0–1.2)
BUN SERPL-MCNC: 24 MG/DL (ref 6–23)
CALCIUM SERPL-MCNC: 8.9 MG/DL (ref 8.6–10.3)
CHLORIDE SERPL-SCNC: 97 MMOL/L (ref 98–107)
CO2 SERPL-SCNC: 31 MMOL/L (ref 21–32)
CREAT SERPL-MCNC: 0.86 MG/DL (ref 0.5–1.05)
EGFRCR SERPLBLD CKD-EPI 2021: 76 ML/MIN/1.73M*2
EOSINOPHIL # BLD AUTO: 0 X10*3/UL (ref 0–0.7)
EOSINOPHIL NFR BLD AUTO: 0 %
ERYTHROCYTE [DISTWIDTH] IN BLOOD BY AUTOMATED COUNT: 13.2 % (ref 11.5–14.5)
EST. AVERAGE GLUCOSE BLD GHB EST-MCNC: 229 MG/DL
GLUCOSE BLD MANUAL STRIP-MCNC: 232 MG/DL (ref 74–99)
GLUCOSE BLD MANUAL STRIP-MCNC: 257 MG/DL (ref 74–99)
GLUCOSE BLD MANUAL STRIP-MCNC: 316 MG/DL (ref 74–99)
GLUCOSE BLD MANUAL STRIP-MCNC: 335 MG/DL (ref 74–99)
GLUCOSE SERPL-MCNC: 368 MG/DL (ref 74–99)
HBA1C MFR BLD: 9.6 %
HCT VFR BLD AUTO: 36 % (ref 36–46)
HGB BLD-MCNC: 12 G/DL (ref 12–16)
IMM GRANULOCYTES # BLD AUTO: 0.09 X10*3/UL (ref 0–0.7)
IMM GRANULOCYTES NFR BLD AUTO: 0.7 % (ref 0–0.9)
LEGIONELLA AG UR QL: NEGATIVE
LYMPHOCYTES # BLD AUTO: 1.66 X10*3/UL (ref 1.2–4.8)
LYMPHOCYTES NFR BLD AUTO: 12.9 %
MCH RBC QN AUTO: 31.4 PG (ref 26–34)
MCHC RBC AUTO-ENTMCNC: 33.3 G/DL (ref 32–36)
MCV RBC AUTO: 94 FL (ref 80–100)
MONOCYTES # BLD AUTO: 1.03 X10*3/UL (ref 0.1–1)
MONOCYTES NFR BLD AUTO: 8 %
NEUTROPHILS # BLD AUTO: 10.03 X10*3/UL (ref 1.2–7.7)
NEUTROPHILS NFR BLD AUTO: 78.3 %
NRBC BLD-RTO: 0.2 /100 WBCS (ref 0–0)
P AXIS: 0 DEGREES
P OFFSET: 181 MS
P ONSET: 124 MS
PLATELET # BLD AUTO: 126 X10*3/UL (ref 150–450)
POTASSIUM SERPL-SCNC: 4 MMOL/L (ref 3.5–5.3)
PR INTERVAL: 178 MS
PROT SERPL-MCNC: 6 G/DL (ref 6.4–8.2)
Q ONSET: 213 MS
QRS COUNT: 12 BEATS
QRS DURATION: 96 MS
QT INTERVAL: 380 MS
QTC CALCULATION(BAZETT): 416 MS
QTC FREDERICIA: 404 MS
R AXIS: -89 DEGREES
RBC # BLD AUTO: 3.82 X10*6/UL (ref 4–5.2)
S PNEUM AG UR QL: NEGATIVE
SODIUM SERPL-SCNC: 136 MMOL/L (ref 136–145)
T AXIS: 110 DEGREES
T OFFSET: 403 MS
VENTRICULAR RATE: 72 BPM
WBC # BLD AUTO: 12.8 X10*3/UL (ref 4.4–11.3)

## 2024-09-24 PROCEDURE — 2500000001 HC RX 250 WO HCPCS SELF ADMINISTERED DRUGS (ALT 637 FOR MEDICARE OP)

## 2024-09-24 PROCEDURE — 71045 X-RAY EXAM CHEST 1 VIEW: CPT | Performed by: RADIOLOGY

## 2024-09-24 PROCEDURE — 94640 AIRWAY INHALATION TREATMENT: CPT

## 2024-09-24 PROCEDURE — 2500000004 HC RX 250 GENERAL PHARMACY W/ HCPCS (ALT 636 FOR OP/ED): Performed by: ANESTHESIOLOGY

## 2024-09-24 PROCEDURE — 36600 WITHDRAWAL OF ARTERIAL BLOOD: CPT

## 2024-09-24 PROCEDURE — 2500000004 HC RX 250 GENERAL PHARMACY W/ HCPCS (ALT 636 FOR OP/ED): Performed by: INTERNAL MEDICINE

## 2024-09-24 PROCEDURE — 94660 CPAP INITIATION&MGMT: CPT

## 2024-09-24 PROCEDURE — 2500000002 HC RX 250 W HCPCS SELF ADMINISTERED DRUGS (ALT 637 FOR MEDICARE OP, ALT 636 FOR OP/ED): Performed by: ANESTHESIOLOGY

## 2024-09-24 PROCEDURE — 71045 X-RAY EXAM CHEST 1 VIEW: CPT

## 2024-09-24 PROCEDURE — 84075 ASSAY ALKALINE PHOSPHATASE: CPT | Performed by: INTERNAL MEDICINE

## 2024-09-24 PROCEDURE — 85025 COMPLETE CBC W/AUTO DIFF WBC: CPT | Performed by: INTERNAL MEDICINE

## 2024-09-24 PROCEDURE — 99232 SBSQ HOSP IP/OBS MODERATE 35: CPT

## 2024-09-24 PROCEDURE — 2500000005 HC RX 250 GENERAL PHARMACY W/O HCPCS: Performed by: ANESTHESIOLOGY

## 2024-09-24 PROCEDURE — 2500000002 HC RX 250 W HCPCS SELF ADMINISTERED DRUGS (ALT 637 FOR MEDICARE OP, ALT 636 FOR OP/ED): Performed by: STUDENT IN AN ORGANIZED HEALTH CARE EDUCATION/TRAINING PROGRAM

## 2024-09-24 PROCEDURE — 36415 COLL VENOUS BLD VENIPUNCTURE: CPT | Performed by: INTERNAL MEDICINE

## 2024-09-24 PROCEDURE — 2500000001 HC RX 250 WO HCPCS SELF ADMINISTERED DRUGS (ALT 637 FOR MEDICARE OP): Performed by: ANESTHESIOLOGY

## 2024-09-24 PROCEDURE — 2500000002 HC RX 250 W HCPCS SELF ADMINISTERED DRUGS (ALT 637 FOR MEDICARE OP, ALT 636 FOR OP/ED)

## 2024-09-24 PROCEDURE — 82947 ASSAY GLUCOSE BLOOD QUANT: CPT

## 2024-09-24 PROCEDURE — 2500000002 HC RX 250 W HCPCS SELF ADMINISTERED DRUGS (ALT 637 FOR MEDICARE OP, ALT 636 FOR OP/ED): Performed by: INTERNAL MEDICINE

## 2024-09-24 RX ORDER — DEXTROSE 50 % IN WATER (D50W) INTRAVENOUS SYRINGE
12.5
Status: DISCONTINUED | OUTPATIENT
Start: 2024-09-24 | End: 2024-09-24 | Stop reason: SDUPTHER

## 2024-09-24 RX ORDER — INSULIN LISPRO 100 [IU]/ML
15 INJECTION, SOLUTION INTRAVENOUS; SUBCUTANEOUS
Status: DISCONTINUED | OUTPATIENT
Start: 2024-09-24 | End: 2024-09-24 | Stop reason: HOSPADM

## 2024-09-24 RX ORDER — INSULIN GLARGINE 100 [IU]/ML
45 INJECTION, SOLUTION SUBCUTANEOUS ONCE
Status: COMPLETED | OUTPATIENT
Start: 2024-09-24 | End: 2024-09-24

## 2024-09-24 RX ORDER — ENOXAPARIN SODIUM 100 MG/ML
60 INJECTION SUBCUTANEOUS EVERY 12 HOURS SCHEDULED
Start: 2024-09-24

## 2024-09-24 RX ORDER — IPRATROPIUM BROMIDE AND ALBUTEROL SULFATE 2.5; .5 MG/3ML; MG/3ML
3 SOLUTION RESPIRATORY (INHALATION)
Start: 2024-09-24

## 2024-09-24 RX ORDER — INSULIN GLARGINE 100 [IU]/ML
90 INJECTION, SOLUTION SUBCUTANEOUS DAILY
Status: DISCONTINUED | OUTPATIENT
Start: 2024-09-25 | End: 2024-09-24 | Stop reason: HOSPADM

## 2024-09-24 RX ORDER — AMOXICILLIN AND CLAVULANATE POTASSIUM 875; 125 MG/1; MG/1
1 TABLET, FILM COATED ORAL 2 TIMES DAILY
Qty: 20 TABLET | Refills: 0 | Status: SHIPPED | OUTPATIENT
Start: 2024-09-24 | End: 2024-09-24 | Stop reason: HOSPADM

## 2024-09-24 RX ORDER — INSULIN LISPRO 100 [IU]/ML
3 INJECTION, SOLUTION INTRAVENOUS; SUBCUTANEOUS ONCE
Status: COMPLETED | OUTPATIENT
Start: 2024-09-24 | End: 2024-09-24

## 2024-09-24 RX ORDER — NIFEDIPINE 60 MG/1
60 TABLET, FILM COATED, EXTENDED RELEASE ORAL
Start: 2024-09-25

## 2024-09-24 RX ORDER — LEVOFLOXACIN 500 MG/1
500 TABLET, FILM COATED ORAL DAILY
Start: 2024-09-24

## 2024-09-24 RX ORDER — GUAIFENESIN 600 MG/1
600 TABLET, EXTENDED RELEASE ORAL 2 TIMES DAILY PRN
Start: 2024-09-24

## 2024-09-24 RX ORDER — IPRATROPIUM BROMIDE AND ALBUTEROL SULFATE 2.5; .5 MG/3ML; MG/3ML
3 SOLUTION RESPIRATORY (INHALATION) EVERY 2 HOUR PRN
Qty: 180 ML | Refills: 11 | Status: SHIPPED | OUTPATIENT
Start: 2024-09-24

## 2024-09-24 RX ORDER — DEXTROSE 50 % IN WATER (D50W) INTRAVENOUS SYRINGE
25
Status: DISCONTINUED | OUTPATIENT
Start: 2024-09-24 | End: 2024-09-24 | Stop reason: SDUPTHER

## 2024-09-24 RX ORDER — FUROSEMIDE 10 MG/ML
20 INJECTION INTRAMUSCULAR; INTRAVENOUS ONCE
Status: COMPLETED | OUTPATIENT
Start: 2024-09-24 | End: 2024-09-24

## 2024-09-24 RX ORDER — NIFEDIPINE 60 MG/1
60 TABLET, FILM COATED, EXTENDED RELEASE ORAL
Status: DISCONTINUED | OUTPATIENT
Start: 2024-09-24 | End: 2024-09-24 | Stop reason: HOSPADM

## 2024-09-24 RX ORDER — INSULIN LISPRO 100 [IU]/ML
0-5 INJECTION, SOLUTION INTRAVENOUS; SUBCUTANEOUS
Status: DISCONTINUED | OUTPATIENT
Start: 2024-09-24 | End: 2024-09-24 | Stop reason: HOSPADM

## 2024-09-24 RX ADMIN — INSULIN LISPRO 3 UNITS: 100 INJECTION, SOLUTION INTRAVENOUS; SUBCUTANEOUS at 00:14

## 2024-09-24 RX ADMIN — GABAPENTIN 600 MG: 300 CAPSULE ORAL at 15:48

## 2024-09-24 RX ADMIN — INSULIN LISPRO 2 UNITS: 100 INJECTION, SOLUTION INTRAVENOUS; SUBCUTANEOUS at 16:07

## 2024-09-24 RX ADMIN — GABAPENTIN 600 MG: 300 CAPSULE ORAL at 06:52

## 2024-09-24 RX ADMIN — ENOXAPARIN SODIUM 60 MG: 60 INJECTION SUBCUTANEOUS at 08:21

## 2024-09-24 RX ADMIN — INSULIN GLARGINE 45 UNITS: 100 INJECTION, SOLUTION SUBCUTANEOUS at 12:18

## 2024-09-24 RX ADMIN — DULOXETINE HYDROCHLORIDE 90 MG: 30 CAPSULE, DELAYED RELEASE ORAL at 08:21

## 2024-09-24 RX ADMIN — IPRATROPIUM BROMIDE AND ALBUTEROL SULFATE 3 ML: .5; 3 SOLUTION RESPIRATORY (INHALATION) at 12:08

## 2024-09-24 RX ADMIN — METOPROLOL TARTRATE 25 MG: 25 TABLET, FILM COATED ORAL at 08:20

## 2024-09-24 RX ADMIN — NIFEDIPINE 60 MG: 30 TABLET, FILM COATED, EXTENDED RELEASE ORAL at 08:21

## 2024-09-24 RX ADMIN — Medication 4 L/MIN: at 07:34

## 2024-09-24 RX ADMIN — ACETAMINOPHEN 975 MG: 325 TABLET ORAL at 08:34

## 2024-09-24 RX ADMIN — INSULIN LISPRO 4 UNITS: 100 INJECTION, SOLUTION INTRAVENOUS; SUBCUTANEOUS at 06:52

## 2024-09-24 RX ADMIN — INSULIN LISPRO 15 UNITS: 100 INJECTION, SOLUTION INTRAVENOUS; SUBCUTANEOUS at 16:06

## 2024-09-24 RX ADMIN — INSULIN GLARGINE 45 UNITS: 100 INJECTION, SOLUTION SUBCUTANEOUS at 08:21

## 2024-09-24 RX ADMIN — CEFTRIAXONE SODIUM 2 G: 2 INJECTION, SOLUTION INTRAVENOUS at 08:21

## 2024-09-24 RX ADMIN — INSULIN LISPRO 15 UNITS: 100 INJECTION, SOLUTION INTRAVENOUS; SUBCUTANEOUS at 12:18

## 2024-09-24 RX ADMIN — ACETAMINOPHEN 975 MG: 325 TABLET ORAL at 15:48

## 2024-09-24 RX ADMIN — FUROSEMIDE 20 MG: 10 INJECTION, SOLUTION INTRAMUSCULAR; INTRAVENOUS at 10:45

## 2024-09-24 RX ADMIN — INSULIN LISPRO 3 UNITS: 100 INJECTION, SOLUTION INTRAVENOUS; SUBCUTANEOUS at 12:20

## 2024-09-24 RX ADMIN — IPRATROPIUM BROMIDE AND ALBUTEROL SULFATE 3 ML: .5; 3 SOLUTION RESPIRATORY (INHALATION) at 07:29

## 2024-09-24 RX ADMIN — INSULIN LISPRO 5 UNITS: 100 INJECTION, SOLUTION INTRAVENOUS; SUBCUTANEOUS at 00:12

## 2024-09-24 ASSESSMENT — PAIN DESCRIPTION - ORIENTATION
ORIENTATION: ANTERIOR
ORIENTATION: MID

## 2024-09-24 ASSESSMENT — PAIN - FUNCTIONAL ASSESSMENT
PAIN_FUNCTIONAL_ASSESSMENT: 0-10

## 2024-09-24 ASSESSMENT — PAIN SCALES - GENERAL
PAINLEVEL_OUTOF10: 0 - NO PAIN
PAINLEVEL_OUTOF10: 1
PAINLEVEL_OUTOF10: 0 - NO PAIN
PAINLEVEL_OUTOF10: 0 - NO PAIN
PAINLEVEL_OUTOF10: 3
PAINLEVEL_OUTOF10: 0 - NO PAIN
PAINLEVEL_OUTOF10: 3

## 2024-09-24 ASSESSMENT — PAIN DESCRIPTION - DESCRIPTORS
DESCRIPTORS: ACHING
DESCRIPTORS: ACHING

## 2024-09-24 ASSESSMENT — PAIN DESCRIPTION - LOCATION
LOCATION: HEAD
LOCATION: HEAD

## 2024-09-24 NOTE — CARE PLAN
Problem: Pain - Adult  Goal: Verbalizes/displays adequate comfort level or baseline comfort level  9/24/2024 0251 by Justyna Ortiz RN  Outcome: Progressing  9/24/2024 0250 by Justyna Ortiz RN  Outcome: Progressing     Problem: Safety - Adult  Goal: Free from fall injury  9/24/2024 0251 by Justyna Ortiz RN  Outcome: Progressing  9/24/2024 0250 by Justyna Ortiz RN  Outcome: Progressing     Problem: Discharge Planning  Goal: Discharge to home or other facility with appropriate resources  9/24/2024 0251 by Justyna Ortiz RN  Outcome: Progressing  9/24/2024 0250 by Justyna Ortiz RN  Outcome: Progressing     Problem: Chronic Conditions and Co-morbidities  Goal: Patient's chronic conditions and co-morbidity symptoms are monitored and maintained or improved  9/24/2024 0251 by Justyna Ortiz RN  Outcome: Progressing  9/24/2024 0250 by Justyna Ortiz RN  Outcome: Progressing     Problem: Skin  Goal: Decreased wound size/increased tissue granulation at next dressing change  9/24/2024 0251 by Justyna Ortiz RN  Outcome: Progressing  Flowsheets (Taken 9/24/2024 0251)  Decreased wound size/increased tissue granulation at next dressing change:   Promote sleep for wound healing   Protective dressings over bony prominences  9/24/2024 0250 by Justyna Ortiz RN  Outcome: Progressing  Flowsheets (Taken 9/23/2024 1014 by Sharonda Carr, RN)  Decreased wound size/increased tissue granulation at next dressing change:   Promote sleep for wound healing   Protective dressings over bony prominences  Goal: Participates in plan/prevention/treatment measures  9/24/2024 0251 by Justyna Ortiz RN  Outcome: Progressing  Flowsheets (Taken 9/24/2024 0251)  Participates in plan/prevention/treatment measures:   Discuss with provider PT/OT consult   Elevate heels   Increase activity/out of bed for meals  9/24/2024 0250 by Justyna Ortiz RN  Outcome: Progressing  Flowsheets (Taken 9/23/2024 1014 by Sharonda Carr, RN)  Participates in plan/prevention/treatment  measures:   Discuss with provider PT/OT consult   Elevate heels  Goal: Prevent/manage excess moisture  9/24/2024 0251 by Justyna Ortiz RN  Outcome: Progressing  Flowsheets (Taken 9/24/2024 0251)  Prevent/manage excess moisture:   Cleanse incontinence/protect with barrier cream   Monitor for/manage infection if present   Follow provider orders for dressing changes   Moisturize dry skin  9/24/2024 0250 by Justyna Ortiz RN  Outcome: Progressing  Flowsheets (Taken 9/23/2024 1014 by Sharonda Carr RN)  Prevent/manage excess moisture:   Cleanse incontinence/protect with barrier cream   Moisturize dry skin  Goal: Prevent/minimize sheer/friction injuries  9/24/2024 0251 by Justyna Ortiz RN  Outcome: Progressing  Flowsheets (Taken 9/24/2024 0251)  Prevent/minimize sheer/friction injuries:   Use pull sheet   Increase activity/out of bed for meals   HOB 30 degrees or less   Turn/reposition every 2 hours/use positioning/transfer devices  9/24/2024 0250 by Justyna Ortiz RN  Outcome: Progressing  Flowsheets (Taken 9/23/2024 1014 by Sharonda Carr RN)  Prevent/minimize sheer/friction injuries:   Use pull sheet   HOB 30 degrees or less   Turn/reposition every 2 hours/use positioning/transfer devices  Goal: Promote/optimize nutrition  9/24/2024 0251 by Justyna Ortiz RN  Outcome: Progressing  Flowsheets (Taken 9/24/2024 0251)  Promote/optimize nutrition:   Assist with feeding   Consume > 50% meals/supplements   Discuss with provider if NPO > 2 days   Reassess MST if dietician not consulted   Monitor/record intake including meals  9/24/2024 0250 by Justyna Ortiz RN  Outcome: Progressing  Flowsheets (Taken 9/23/2024 1014 by Sharonda Carr RN)  Promote/optimize nutrition: Monitor/record intake including meals  Goal: Promote skin healing  9/24/2024 0251 by Justyna Ortiz RN  Outcome: Progressing  Flowsheets (Taken 9/24/2024 0251)  Promote skin healing:   Assess skin/pad under line(s)/device(s)   Protective dressings over bony  prominences   Turn/reposition every 2 hours/use positioning/transfer devices   Ensure correct size (line/device) and apply per  instructions  9/24/2024 0250 by Justyna Ortiz RN  Outcome: Progressing  Flowsheets (Taken 9/23/2024 1014 by Sharonda Carr RN)  Promote skin healing:   Turn/reposition every 2 hours/use positioning/transfer devices   Protective dressings over bony prominences   The patient's goals for the shift include      The clinical goals for the shift include patient to remain hemodynamically stable throughout shift

## 2024-09-24 NOTE — SIGNIFICANT EVENT
HTN, HLD, DM2, HFpEF, mild YADIRA with severe nocturnal hypoxemia, obesity presenting with AMS/fall at SNF with acute on chronic resp failure with hypoxia/hypercapnia due to CAP and pulmonary edema. No sign of copd exacerbation.     #CAP  #Acute on chronic resp failure with hypoxia/hypercapnia - improving  #Acute pulmonary edema  #COPD not in exacerbation - no wheezing and moving air on exam  -CTX/azithro  -procal, s pneumo, legionella antigens  -diuresis with lasix  -duoneb tid     #HFpEF with exacerbation  #Uncontrolled HTN  #PH - likely groups 2 and 3  -continue diuresis, goal -1L  -Nifedipine initiated      #Acute metabolic encephalopathy d/t hypercapnia. Improving  -Monitor mentation    -Frequent reorientation     #SIXTO - likely cardiorenal  -improving with diuresis, continue to trend     #DM2  -basal/bolus insulin  -Goal glucose 140-180 in ICU    #Migraine headaches, repeat CTH negative for acute pathology  -PRN pain control     #Fall at home - negative extensive trauma survey/cts  -PRN pain control     Lines/tubes: PIV  Prophylaxis: lovenox   Drips: none  Fluids: PRN  Oxygen: prn  Nutrition: ADAT  Restraints: none  Code status: full code  Dispo: stable for transfer to medical floor

## 2024-09-24 NOTE — DISCHARGE SUMMARY
Discharge Diagnosis  Acute hypercapnic respiratory failure (Multi)    Issues Requiring Follow-Up  Patient fully evaluated 9/24, resting in bed no s/s or c/o acute difficulties at this time. Past medical history - HTN, HLD, DM2, HFpEF, mild YADIRA with severe nocturnal hypoxemia, obesity, admitted following  AMS post fall at [facility] for acute on chronic resp failure with hypoxia/hypercapnia due to CAP and pulmonary edema, Acute metabolic encephalopathy d/t hypercapnia. Patient on supplemental oxygen at 4L NC at baseline, no longer requiring continuous BIPAP, continue BIPAP @ HS and with naps. Patient more mentation improved today and is at baseline. Blood cultures no growth at 1 day, continue Patient states she is feeling much better and significant clinical improvement noted, patient medically cleared for discharge today to return to [facility].  Patient alert, awake, and smiling in bed, denies acute difficulties at this time, medications and labs reviewed, continue current and repeat labs in the AM if patient still hospitalized. Patient aware and agreeable to current plan, continue plan as above. I spent 30 minutes in the professional and overall care of this patient.  Discharge Meds     Medication List      START taking these medications     enoxaparin 60 mg/0.6 mL syringe; Commonly known as: Lovenox; Inject 0.6   mL (60 mg) under the skin every 12 hours.   guaiFENesin 600 mg 12 hr tablet; Commonly known as: Mucinex; Take 1   tablet (600 mg) by mouth 2 times a day as needed for congestion. Do not   crush, chew, or split.   * ipratropium-albuteroL 0.5-2.5 mg/3 mL nebulizer solution; Commonly   known as: Duo-Neb; Take 3 mL by nebulization every 2 hours if needed for   wheezing.   * ipratropium-albuteroL 0.5-2.5 mg/3 mL nebulizer solution; Commonly   known as: Duo-Neb; Take 3 mL by nebulization 3 times a day.   levoFLOXacin 500 mg tablet; Commonly known as: Levaquin; Take 1 tablet   (500 mg) by mouth once  daily.   NIFEdipine ER 60 mg 24 hr tablet; Commonly known as: Adalat CC; Take 1   tablet (60 mg) by mouth once daily in the morning. Take before meals. Do   not crush, chew, or split.; Start taking on: September 25, 2024   oxygen gas therapy; Commonly known as: O2; Inhale 4 L/min once every 24   hours.  * This list has 2 medication(s) that are the same as other medications   prescribed for you. Read the directions carefully, and ask your doctor or   other care provider to review them with you.     CONTINUE taking these medications     acetaminophen 325 mg tablet; Commonly known as: Tylenol   alum-mag hydroxide-simeth 400-400-40 mg/5 mL suspension; Commonly known   as: Maalox MAX   atorvastatin 40 mg tablet; Commonly known as: Lipitor   bisacodyl 10 mg suppository; Commonly known as: Dulcolax   cholecalciferol 25 MCG (1000 UT) tablet; Commonly known as: Vitamin D-3   cyanocobalamin 500 mcg tablet; Commonly known as: Vitamin B-12   * Cymbalta 60 mg DR capsule; Generic drug: DULoxetine   * DULoxetine 30 mg DR capsule; Commonly known as: Cymbalta   Fleet Enema 19-7 gram/118 mL enema enema; Generic drug: sodium   phosphates   gabapentin 300 mg capsule; Commonly known as: Neurontin   * HumaLOG KwikPen Insulin 100 unit/mL injection; Generic drug: insulin   lispro   * HumaLOG KwikPen Insulin 100 unit/mL injection; Generic drug: insulin   lispro   ibuprofen 200 mg tablet   lidocaine 4 % patch   magnesium hydroxide 400 mg/5 mL suspension; Commonly known as: Milk of   Magnesia   melatonin 3 mg tablet   menthol 5.4 mg lozenge   metoprolol tartrate 25 mg tablet; Commonly known as: Lopressor   Miralax 17 gram/dose powder; Generic drug: polyethylene glycol   omeprazole 20 mg DR capsule; Commonly known as: PriLOSEC   ondansetron 4 mg tablet; Commonly known as: Zofran   oxybutynin XL 15 mg 24 hr tablet; Commonly known as: Ditropan-XL   Semglee(insulin glarg-yfgn)Pen 100 unit/mL (3 mL) Pen; Generic drug:   insulin glargine-yfgn    tiZANidine 2 mg tablet; Commonly known as: Zanaflex   traZODone 150 mg tablet; Commonly known as: Desyrel  * This list has 4 medication(s) that are the same as other medications   prescribed for you. Read the directions carefully, and ask your doctor or   other care provider to review them with you.     STOP taking these medications     albuterol 2.5 mg /3 mL (0.083 %) nebulizer solution   albuterol 90 mcg/actuation inhaler       Test Results Pending At Discharge  Pending Labs       Order Current Status    Mycoplasma pneumoniae antibody, IgM In process    Blood Culture Preliminary result    Blood Culture Preliminary result            Hospital Course         Armando De Oliveira MD   Physician  Internal Medicine     Progress Notes      Signed     Date of Service: 9/23/2024 12:44 PM     Signed       Expand All Collapse All    Guillermina Bonilla is a 63 y.o. female on day 1 of admission presenting with Acute hypercapnic respiratory failure (Multi).           Subjective  Patient fully evaluated 9/23, resting in bed with HOB elevated and still requiring continuous cardiac and pulse oximetry monitoring due to complex commodities and potential for rapid decline. Patient with   HTN, HLD, DM2, HFpEF, YADIRA, obesity presenting with AMS/fall at SNF with acute on chronic resp failure with hypoxia/hypercapnia due to CAP and pulmonary edema, possible COPD exacerbation. Patient on supplemental oxygen via NC with plan to resume BiPAP for CO2 retention as tolerated. Patient awake, slow to respond with slight confusion noted, continue to monitor mentation. medications and labs reviewed, continue current IV antibiotics  awaiting blood cultures.  and repeat labs in the AM. Patient aware and agreeable to current plan, continue plan as above. I spent 50 minutes in the professional and overall care of this patient.              Objective  Last Recorded Vitals  /77   Pulse 74   Temp 35.4 °C (95.7 °F) (Temporal)   Resp 25   Wt 137 kg (302  lb 0.5 oz)   SpO2 92%   Intake/Output last 3 Shifts:     Intake/Output Summary (Last 24 hours) at 9/23/2024 1244  Last data filed at 9/23/2024 1200      Gross per 24 hour   Intake 220 ml   Output 2405 ml   Net -2185 ml         Admission Weight  Weight: 150 kg (330 lb 11 oz) (09/22/24 1730)     Daily Weight  09/23/24 : 137 kg (302 lb 0.5 oz)     Image Results  ECG 12 lead  Sinus rhythm  Probable left atrial enlargement  Anteroseptal infarct, age indeterminate        Physical Exam     Relevant Results                       Assessment/Plan                     Expand All Collapse All    Critical Care Medicine:  History & Physical     History of Present Illness      63 y.o. female with a PMH of fibromyalgia, CAD s/p CABG, HFpEF, COPD, YADIRA, super obese (BMI 56), T2DM presented to the ED from SNF for AMS.  Staff found her lying in bed confused.  Fell last night and has not been acting right since.  EMS contacted and administered Narcan.  No response.  Brought to the ED.  SpO2 76% on room air.  Provided supplemental O2.  Other vitals unimpressive.  ECG negative for acute ST changes.  Labs notable for serum Cr 1.08 (previously 0.6 to 0.7), , troponin 343, WBC 14.2.  VBG 7.18/93/55.  Put on BiPAP.  CT chest demonstrated bibasilar opacities.  Extensive CT imaging otherwise negative for acute process.  Repeat troponin 402.  Repeat VBG on BiPAP 18/8 was 7.20/86/37 with FiO2 70%.  Patient treated with ASA, steroids, antibiotics (Rocephin and azithromycin), and Lasix.  Admitted to the ICU.     Arousable to sternal rub.  Only moans, not understandable.  Weakly follows commands, moves all extremities.     Last echo done 9/2023 demonstrated EF 50-55%, mod pulm HTN (RVSP 39.5 mm Hg).        ROS:  Unable to obtain a complete review of systems due to patient's altered mental status.        Objective  Previous History      Medical History  As above.     Surgical History      Surgical History              Past Surgical History:    Procedure Laterality Date    CERVICAL FUSION   2014     Cervical Vertebral Fusion     SECTION, CLASSIC   2014      Section    OTHER SURGICAL HISTORY   2020     Ankle surgery    OTHER SURGICAL HISTORY   2020     Coronary artery bypass graft         Allergies      RX Allergies              Allergies   Allergen Reactions    Diclofenac Epolamine Hives, Itching and Swelling    Tetracycline Rash    Macrobid [Nitrofurantoin Monohyd/M-Cryst] Unknown    Prochlorperazine Unknown    Adhesive Tape-Silicones Rash    Amoxicillin-Pot Clavulanate Rash    Azithromycin Rash    Benzalkonium Chloride Swelling    Erythromycin Rash    Neomycin-Bacitracin-Polymyxin Swelling    Nitro Rash       Has tolerated NTG SL tsbs            Home Medications          Current Outpatient Medications   Medication Instructions    acetaminophen (TYLENOL) 650 mg, oral, Every 6 hours PRN    albuterol 90 mcg/actuation inhaler 2 puffs, inhalation, Every 6 hours PRN    albuterol 2.5 mg, nebulization, Every 4 hours PRN    alum-mag hydroxide-simeth (Maalox MAX) 400-400-40 mg/5 mL suspension 30 mL, oral, Every 6 hours PRN    atorvastatin (LIPITOR) 40 mg, oral, Nightly    bisacodyl (DULCOLAX) 10 mg, rectal, Daily PRN    cholecalciferol (VITAMIN D-3) 2,000 Units, oral, Nightly    cyanocobalamin (VITAMIN B-12) 500 mcg, oral, Nightly    DULoxetine (CYMBALTA) 60 mg, oral, Daily, With 30mg capsule    DULoxetine (CYMBALTA) 30 mg, oral, Daily, With 60mg capsule    gabapentin (NEURONTIN) 600 mg, oral, 3 times daily    HumaLOG KwikPen Insulin 50 Units, subcutaneous, 3 times daily, Plus sliding scale. Hold for BS<200    ibuprofen 600 mg, oral, Every 6 hours PRN    insulin glargine-yfgn (SEMGLEE(INSULIN GLARG-YFGN)PEN) 90 Units, subcutaneous, Nightly    insulin lispro (HUMALOG KWIKPEN INSULIN) 0-14 Units, subcutaneous, 3 times daily, Per sliding scale: <60 = notify MD, 151-200 = 2 units, 201-250 = 4 units, 251-300 = 6 units, 301-350  = 8 units, 351-400 = 10 units, 401-450 = 12 units, 451-500 = 14 units, >500 = notify MD    lidocaine 4 % patch 2 patches, transdermal, Daily PRN, To bach and chest. Remove & discard patch within 12 hours or as directed by MD.    magnesium hydroxide (Milk of Magnesia) 400 mg/5 mL suspension 30 mL, oral, Daily PRN    melatonin 3 mg tablet 1 tablet, oral, Nightly    menthol 5.4 mg lozenge 1 drop, mucous membrane, Every 4 hours PRN    metoprolol tartrate (Lopressor) 25 mg tablet 1 tablet, oral, 2 times daily    omeprazole (PriLOSEC) 20 mg DR capsule 1 capsule, oral, 2 times daily    ondansetron (ZOFRAN) 4 mg, oral, Every 12 hours PRN    oxybutynin XL (DITROPAN-XL) 15 mg, oral, Daily, Do not crush, chew, or split.    polyethylene glycol (MIRALAX) 17 g, oral, Daily PRN    sodium phosphates (Fleet Enema) 19-7 gram/118 mL enema enema 118 mL, rectal, Daily PRN    tiZANidine (ZANAFLEX) 2 mg, oral, Every 8 hours PRN    traZODone (DESYREL) 150 mg, oral, Nightly      Social History  History - Tobacco Use - Plain Text   Social History            Tobacco Use   Smoking Status Not on file   Smokeless Tobacco Not on file          reports that she does not currently use alcohol.    reports no history of drug use.      Family History  family history includes Coronary artery disease in her father; DAVID disease in her mother.     Objective          Vitals          Vitals:     09/22/24 1730   Weight: 150 kg (330 lb 11 oz)      Body mass index is 56.76 kg/m².          9/22/2024     7:08 PM 9/22/2024     7:09 PM 9/22/2024     7:12 PM 9/22/2024     7:15 PM 9/22/2024     7:30 PM 9/22/2024     8:35 PM 9/22/2024     9:00 PM   Vitals   Systolic 116       109 102 104   Diastolic 68       66 57 58   Heart Rate 73 78 70 76 77 74 76   Temp 36.5 °C (97.7 °F)       36.2 °C (97.2 °F)       Resp 27 27 20 20 20 20 23      Vent settings:  FiO2 (%):  [70 %-100 %] 70 %  S RR:  [14-20] 20  No intake or output data in the 24 hours ending 09/22/24 7012    "  Physical Exam  Neuro:   See HPI.  Eyes:PERRL, clear sclerae.  CV:        Normal S1, S2.  RRR.  No m/r/g.  Resp:     Diminished.  GI:+BS, abd soft, NT, ND.  Ext:        Trace pitting BLE edema.  Skin:Warm and dry.     Medications      Scheduled Medications:      Scheduled Medications   aspirin, 324 mg, oral, Once  furosemide, 40 mg, intravenous, Once  ondansetron, 4 mg, intravenous, Once         Continuous Medications:   Continuous Medications          PRN Medications:      Labs              Results from last 72 hours   Lab Units 09/22/24  1742   GLUCOSE mg/dL 190*   SODIUM mmol/L 140   POTASSIUM mmol/L 5.1   CHLORIDE mmol/L 103   CO2 mmol/L 30   BUN mg/dL 18   CREATININE mg/dL 1.08*   EGFR mL/min/1.73m*2 57*   CALCIUM mg/dL 8.6   ALBUMIN g/dL 3.8   MAGNESIUM mg/dL 2.19   PHOSPHORUS mg/dL 6.4*              Results from last 72 hours   Lab Units 09/22/24  1742   ALK PHOS U/L 67   ALT U/L 14   AST U/L 27   BILIRUBIN TOTAL mg/dL 0.3   PROTEIN TOTAL g/dL 6.6                Results from last 72 hours   Lab Units 09/22/24  1859 09/22/24  1742   TROPHS ng/L 402* 343*                  Results from last 72 hours   Lab Units 09/22/24  1742   WBC AUTO x10*3/uL 14.2*   NRBC AUTO /100 WBCs 0.1*   RBC AUTO x10*6/uL 4.09   HEMOGLOBIN g/dL 12.6   HEMATOCRIT % 40.1   MCV fL 98   MCH pg 30.8   MCHC g/dL 31.4*   RDW % 13.2   PLATELETS AUTO x10*3/uL 133*                  Results from last 72 hours   Lab Units 09/22/24  2017 09/22/24  1859 09/22/24  1742   FIO2 % 70 70 100                  Lab Results   Component Value Date     GRAMSTAIN   09/20/2023       THE GRAM STAIN INDICATES THAT THE SPECIMEN CONTAINS~SIGNIFICANT SALIVARY CONTAMINATION.~THE CULTURE WILL NOT BE PROCESSED AS IT WILL BE OF LIMITED~DIAGNOSTIC VALUE. A SPECIMEN OF BETTER QUALITY SHOULD BE~SUBMITTED IF CLINICALLY INDICATED.      No results found for: \"URINECULTURE\"     Imaging and Diagnostic Studies      Recent imaging and diagnostic studies reviewed.         "   Assessment / Plan      Acute metabolic encephalopathy  Acute on chronic diastolic CHF  Acute hypoxemic and hypercapnic respiratory failure  COPD exacerbation?  Acute MI, type 2  T2DM  Leukocytosis, possible CAP     -Continue NIPPV.  Start nebulized bronchodilators, steroids.  Additional Lasix.  -Likely type 2 MI in setting of respiratory failure.  Conservative management.  -Sliding scale insulin.  -Continue course of antibiotics for now with Rocephin and azithromycin.                      This patient has a urinary catheter              Reason for the urinary catheter remaining today?            Assessment & Plan  Acute hypercapnic respiratory failure (Multi)     Patient fully evaluated 9/23, resting in bed with HOB elevated and still requiring continuous cardiac and pulse oximetry monitoring due to complex commodities and potential for rapid decline. Patient with   HTN, HLD, DM2, HFpEF, YADIRA, obesity presenting with AMS/fall at [facility] with acute on chronic resp failure with hypoxia/hypercapnia due to CAP and pulmonary edema, possible COPD exacerbation. Patient on supplemental oxygen via NC with plan to resume BiPAP for CO2 retention as tolerated. Patient awake, slow to respond with slight confusion noted, continue to monitor mentation. medications and labs reviewed, continue current IV antibiotics awaiting blood cultures.  and repeat labs in the AM. Patient aware and agreeable to current plan, continue plan as above. I spent 50 minutes in the professional and overall care of this patient.                    Revision History         Pertinent Physical Exam At Time of Discharge  Physical Exam  Patient fully evaluated 9/24, resting in bed no s/s or c/o acute difficulties at this time. Past medical history - HTN, HLD, DM2, HFpEF, mild YADIRA with severe nocturnal hypoxemia, obesity, admitted following  AMS post fall at [facility] for acute on chronic resp failure with hypoxia/hypercapnia due to CAP and pulmonary edema,  Acute metabolic encephalopathy d/t hypercapnia. Patient on supplemental oxygen at 4L NC at baseline, no longer requiring continuous BIPAP, continue BIPAP @ HS and with naps. Patient more mentation improved today and is at baseline. Blood cultures no growth at 1 day, continue Patient states she is feeling much better and significant clinical improvement noted, patient medically cleared for discharge today to return to [facility].  Patient alert, awake, and smiling in bed, denies acute difficulties at this time, medications and labs reviewed, continue current and repeat labs in the AM if patient still hospitalized. Patient aware and agreeable to current plan, continue plan as above. I spent 30 minutes in the professional and overall care of this patient.  Outpatient Follow-Up  No future appointments.      Armando De Oliveira MD

## 2024-09-24 NOTE — PROGRESS NOTES
[facility] Pulmonary and Critical Care Medicine   Progress Note        Subjective     Overnight the patient experienced some confusion but did receive a trazodone for sleep.  She wore BiPAP for about 30 minutes last night.  Today she feels well, is not short of breath and is coughing less.  Denies headache, fever/chills, nausea, vomiting, diarrhea, constipation    Scheduled Medications:   azithromycin, 500 mg, intravenous, q24h  cefTRIAXone, 1 g, intravenous, q12h  DULoxetine, 90 mg, oral, Daily  enoxaparin, 60 mg, subcutaneous, q12h RAMA  gabapentin, 600 mg, oral, q8h RAMA  insulin lispro, 0-5 Units, subcutaneous, q6h  methylPREDNISolone sodium succinate (PF), 40 mg, intravenous, q8h  ondansetron, 4 mg, intravenous, Once       Continuous Medications:       PRN Medications:   PRN medications: acetaminophen, dextrose, dextrose, glucagon, glucagon, ipratropium-albuteroL, oxygen, tiZANidine    Objective   Vitals:  Most Recent:  Vitals:    09/23/24 0600   BP: 160/79   Pulse: 57   Resp: 20   Temp:    SpO2: 95%       24hr Min/Max:  Temp  Min: 36 °C (96.8 °F)  Max: 36.5 °C (97.7 °F)  Pulse  Min: 57  Max: 89  BP  Min: 102/57  Max: 160/79  Resp  Min: 15  Max: 38  SpO2  Min: 76 %  Max: 100 %    LDA:   Urethral Catheter Non-latex (Active)   Placement Date/Time: 09/22/24 2113   Hand Hygiene Completed: Yes  Catheter Type: Non-latex  Catheter Balloon Size: 10 mL   Number of days: 0         Vent settings:  FiO2 (%):  [50 %-100 %] 50 %  S RR:  [14-20] 20    Hemodynamic parameters for last 24 hours:         Intake/Output Summary (Last 24 hours) at 9/23/2024 0655  Last data filed at 9/23/2024 0600  Gross per 24 hour   Intake --   Output 1690 ml   Net -1690 ml         Physical exam:    Physical Exam  Vitals and nursing note reviewed.   Constitutional:       Appearance: She is obese. She is ill-appearing.   Cardiovascular:      Rate and Rhythm: Normal rate and regular rhythm.   Pulmonary:      Effort: Pulmonary effort is  normal. No respiratory distress.      Breath sounds: Rales present.   Abdominal:      General: Abdomen is flat.      Palpations: Abdomen is soft.      Tenderness: There is no abdominal tenderness.   Musculoskeletal:      Right lower leg: Edema (trace) present.      Left lower leg: Edema (trace) present.   Skin:     General: Skin is warm and dry.   Neurological:      General: No focal deficit present.      Mental Status: She is alert and oriented to person, place, and time.        Lab/Radiology/Diagnostic Review:    All labs and Imaging have been personally reviewed.     Assessment/Plan     63F with a PMH of fibromyalgia, CAD s/p CABG, HFpEF, COPD, YADIRA, obesity class III, and T2DM presented to the ED from SNF for AMS.        Neuro:    #Acute metabolic encephalopathy, improving  -CT head/c-spine negative for acute pathology   -Toxicology panel negative    Cardiovascular    #Acute HFpEF exacerbation, improving  #Type 2 MI  #Hypertension  -Most recent echo 9/21/2023 with EF 50 to 55%, impaired diastolic filling, moderately elevated RVSP  -Troponin peaked at 482, currently down trending  -Additional 20 mg IV Lasix today, strict ins/outs. Restart home Lopressor 25 mg BID, increase nifedipine to 60 mg daily     Pulmonary:    #Acute hypoxemic, hypercapnic respiratory failure, improving  -CTA chest showing bilateral airspace opacities  -Sputum culture, strep pneumo, legionella, mycoplasma antigens ordered  -Continue CAP coverage with Rocephin and Azithromycin in the setting of elevated procal, continue DuoNebs 3 times daily and BiPAP as needed    GI:  No acute issues    Renal:     #SIXTO, improving  -Likely secondary to volume overload in the setting of HFpEF  -Creatinine on admit 1.08, trending down with diuresis. Monitor RFP    Endocrine:    #Type 2 diabetes  -Increase lantus to home dose of 90u, add 15u Lispro with meals. Sliding scale insulin, hemoglobin A1c pending    Heme/Onc:  #Thrombocytopenia  -Platelets 124, will  continue to monitor    ID:  #Concern for CAP  -Continue Rocephin and azithromycin (Day 3)  -Culture Data: Blood cx no growth at 1 day    Skin/MSK:  No acute issues    ICU CHECK LIST:   Antimicrobials: Rocephin, azithromycin  Oxygen: Nasal cannula, BiPAP as needed  Drips: None  Feeding/Fluids: ml diet  Analgesia: Tylenol  Sedation: None  Thromboprophylaxis: Lovenox  Glycemic control: SSI, lantus, mealtime insulin  Bowel care: PRN   Indwelling catheters: Jimenez   Lines: PIVs   Restraints: None  Dispo: Stable for transfer out of ICU  Code Status: Full      Aldair Shafer MD  PGY-1, Internal Medicine    The above note is preliminary pending attestation by attending physician.

## 2024-09-24 NOTE — PROGRESS NOTES
09/24/24 1532   Discharge Planning   Type of Residence Nursing home/residential care   Home or Post Acute Services Post acute facilities (Rehab/SNF/etc)   Type of Post Acute Facility Services Long term care   Expected Discharge Disposition Inter   Does the patient need discharge transport arranged? Yes   RoundTrip coordination needed? Yes     Discharge in for today. Patient returning to [facility], bed hold. DSC tasked to arrange transport. Awaiting transport time.     UPDATE 1556: Transport confirmed for 545PM, nursing updated. Dsc to update facility via [facility].     LATOSHA JULIEN RN TCC

## 2024-09-24 NOTE — PROGRESS NOTES
Dispo planning for [facility]. Referral sent yesterday 9/23. [facility] reviewed, patient can return to [facility] when ready.  TCC to continue to follow for discharge planning.   LATOSHA JULIEN RN TCC

## 2024-09-24 NOTE — CARE PLAN
Problem: Pain - Adult  Goal: Verbalizes/displays adequate comfort level or baseline comfort level  Outcome: Progressing     Problem: Safety - Adult  Goal: Free from fall injury  Outcome: Progressing     Problem: Discharge Planning  Goal: Discharge to home or other facility with appropriate resources  Outcome: Progressing     Problem: Chronic Conditions and Co-morbidities  Goal: Patient's chronic conditions and co-morbidity symptoms are monitored and maintained or improved  Outcome: Progressing     Problem: Skin  Goal: Decreased wound size/increased tissue granulation at next dressing change  Outcome: Progressing  Flowsheets (Taken 9/24/2024 1651)  Decreased wound size/increased tissue granulation at next dressing change:   Promote sleep for wound healing   Protective dressings over bony prominences  Goal: Participates in plan/prevention/treatment measures  Outcome: Progressing  Flowsheets (Taken 9/24/2024 1651)  Participates in plan/prevention/treatment measures:   Discuss with provider PT/OT consult   Elevate heels  Goal: Prevent/manage excess moisture  Outcome: Progressing  Flowsheets (Taken 9/24/2024 1651)  Prevent/manage excess moisture:   Cleanse incontinence/protect with barrier cream   Monitor for/manage infection if present  Goal: Prevent/minimize sheer/friction injuries  Outcome: Progressing  Flowsheets (Taken 9/24/2024 1651)  Prevent/minimize sheer/friction injuries:   Use pull sheet   HOB 30 degrees or less   Turn/reposition every 2 hours/use positioning/transfer devices  Goal: Promote/optimize nutrition  Outcome: Progressing  Flowsheets (Taken 9/24/2024 1651)  Promote/optimize nutrition:   Monitor/record intake including meals   Consume > 50% meals/supplements  Goal: Promote skin healing  Outcome: Progressing  Flowsheets (Taken 9/24/2024 1651)  Promote skin healing:   Assess skin/pad under line(s)/device(s)   Turn/reposition every 2 hours/use positioning/transfer devices   Protective dressings over  bony prominences   The patient's goals for the shift include  control headaches    The clinical goals for the shift include hemodynamic stability

## 2024-09-24 NOTE — CARE PLAN
The patient's goals for the shift include      The clinical goals for the shift include patient to remain hemodynamically stable throughout shift      Problem: Pain - Adult  Goal: Verbalizes/displays adequate comfort level or baseline comfort level  Outcome: Progressing     Problem: Safety - Adult  Goal: Free from fall injury  Outcome: Progressing     Problem: Discharge Planning  Goal: Discharge to home or other facility with appropriate resources  Outcome: Progressing     Problem: Chronic Conditions and Co-morbidities  Goal: Patient's chronic conditions and co-morbidity symptoms are monitored and maintained or improved  Outcome: Progressing     Problem: Skin  Goal: Decreased wound size/increased tissue granulation at next dressing change  Outcome: Progressing  Flowsheets (Taken 9/23/2024 1014 by Sharonda Carr RN)  Decreased wound size/increased tissue granulation at next dressing change:   Promote sleep for wound healing   Protective dressings over bony prominences  Goal: Participates in plan/prevention/treatment measures  Outcome: Progressing  Flowsheets (Taken 9/23/2024 1014 by Sharonda Carr RN)  Participates in plan/prevention/treatment measures:   Discuss with provider PT/OT consult   Elevate heels  Goal: Prevent/manage excess moisture  Outcome: Progressing  Flowsheets (Taken 9/23/2024 1014 by Sharonda Carr RN)  Prevent/manage excess moisture:   Cleanse incontinence/protect with barrier cream   Moisturize dry skin  Goal: Prevent/minimize sheer/friction injuries  Outcome: Progressing  Flowsheets (Taken 9/23/2024 1014 by Sharonda Carr RN)  Prevent/minimize sheer/friction injuries:   Use pull sheet   HOB 30 degrees or less   Turn/reposition every 2 hours/use positioning/transfer devices  Goal: Promote/optimize nutrition  Outcome: Progressing  Flowsheets (Taken 9/23/2024 1014 by Sharonda Carr RN)  Promote/optimize nutrition: Monitor/record intake including meals  Goal:  Promote skin healing  Outcome: Progressing  Flowsheets (Taken 9/23/2024 1014 by Sharonda Carr RN)  Promote skin healing:   Turn/reposition every 2 hours/use positioning/transfer devices   Protective dressings over bony prominences

## 2024-09-24 NOTE — CONSULTS
"Nutrition Initial Assessment:   Nutrition Assessment    Reason for Assessment: Admission nursing screening    Patient is a 63 y.o. female presenting with acute hypercapnic respiratory failure.       Nutrition History:  Energy Intake: Good > 75 %  Food and Nutrient History: Pt reports eating most of her breakfast today but feeling nauseated after becuase she hadn't eaten for a few days. Was NPO the last 2 days. Reports she was eating well PTA. Denies diarrhea/constipation. Reports issues swallowing spaghetti but does not have trouble with any other foods.  Vitamin/Herbal Supplement Use: vitamin D3 and vitamin B12 per home med list  Food Allergies/Intolerances:  None  GI Symptoms: Nausea - after breakfast today 9/24/24  Oral Problems:  swallowing issues with spaghetti only       Anthropometrics:  Height: 162.6 cm (5' 4\")   Weight: 137 kg (302 lb 0.5 oz)   BMI (Calculated): 51.82  IBW/kg (Dietitian Calculated): 54.5 kg          Weight History:   Wt Readings from Last 10 Encounters:   09/23/24 137 kg (302 lb 0.5 oz)   09/10/24 127 kg (279 lb)   02/21/24 127 kg (279 lb 15.8 oz)   05/21/21 127 kg (280 lb)   02/19/21 127 kg (280 lb)   02/28/20 127 kg (280 lb)      Weight Change %:  Weight History / % Weight Change: Pt denies wt changes, states UBW is 280#. Current wt reflects wt gain of ~23# x 2 weeks. Question accuracy of wt records as it is unlikely pt gained 23# in 2 weeks unless it is from fluctuations in fluid status.  Significant Weight Loss: No    Nutrition Focused Physical Exam Findings:    Subcutaneous Fat Loss:   Orbital Fat Pads: Well nourished (slightly bulging fat pads)  Buccal Fat Pads: Well nourished (full, rounded cheeks)  Triceps: Well nourished (ample fat tissue)  Muscle Wasting:  Temporalis: Well nourished (well-defined muscle)  Pectoralis (Clavicular Region): Well nourished (clavicle not visible)  Deltoid/Trapezius: Well nourished (rounded appearance at arm, shoulder, neck)  Edema:  Edema: " none  Physical Findings:  Nails: Negative  Skin: Positive (PI sacrum stage 1; face abrasion, left arm skin tear per nursing assessment)    Nutrition Significant Labs:    Reviewed   Nutrition Specific Medications:  Reviewed     I/O:   Last BM Date:  (before arrival);      Dietary Orders (From admission, onward)       Start     Ordered    09/23/24 2156  Adult diet Consistent Carb; CCD 60 gm/meal  Diet effective now        Question Answer Comment   Diet type Consistent Carb    Carb diet selection: CCD 60 gm/meal        09/23/24 2155                     Estimated Needs:   Total Energy Estimated Needs (kCal): 1362 kCal  Method for Estimating Needs: 25 kcal/kg IBW  Total Protein Estimated Needs (g): 54 g  Method for Estimating Needs: 1 g/kg IBW  Total Fluid Estimated Needs (mL): 1362 mL  Method for Estimating Needs: 1 ml/kcal or per MD        Nutrition Diagnosis   Malnutrition Diagnosis  Patient has Malnutrition Diagnosis: No    Nutrition Diagnosis  Patient has Nutrition Diagnosis: Yes  Diagnosis Status (1): New  Nutrition Diagnosis 1: Altered nutrition related to laboratory values  Related to (1): diabetes  As Evidenced by (1): BG >300 mg/dl during admission       Nutrition Interventions/Recommendations         Nutrition Prescription:  Individualized Nutrition Prescription Provided for : 60 g CHO/meal consistent carbohydrate diet        Nutrition Interventions:   Interventions: Meals and snacks  Meals and Snacks: Carbohydrate-modified diet  Goal: Consumes 3 meals per day    Collaboration and Referral of Nutrition Care: Collaboration by nutrition professional with other providers  Goal: Discussed in interdisciplinary rounds    Nutrition Education:   Patient with no diet related questions at this time.         Nutrition Monitoring and Evaluation   Food/Nutrient Related History Monitoring  Monitoring and Evaluation Plan: Energy intake, Amount of food, Fluid intake  Energy Intake: Estimated energy intake  Criteria: Pt meets  >75% of estimated energy needs  Fluid Intake: Estimated fluid intake  Criteria: Meets >75% of estimated fluid needs  Amount of Food: Estimated amout of food  Criteria: Pt consumes >75% of meals    Body Composition/Growth/Weight History  Monitoring and Evaluation Plan: Weight  Weight: Measured weight  Criteria: Maintains stable weight    Biochemical Data, Medical Tests and Procedures  Monitoring and Evaluation Plan: Glucose/endocrine profile  Glucose/Endocrine Profile: Glucose, casual  Criteria: BG within desirable range    Nutrition Focused Physical Findings  Monitoring and Evaluation Plan: Skin  Skin: Impaired wound healing  Criteria: Promote wound healing through adequate nutrition         Time Spent (min): 45 minutes

## 2024-09-25 LAB
ATRIAL RATE: 74 BPM
M PNEUMO IGM SER IA-ACNC: 0 U/L
P AXIS: -16 DEGREES
PR INTERVAL: 184 MS
Q ONSET: 253 MS
QRS COUNT: 12 BEATS
QRS DURATION: 97 MS
QT INTERVAL: 413 MS
QTC CALCULATION(BAZETT): 459 MS
QTC FREDERICIA: 443 MS
R AXIS: -5 DEGREES
T AXIS: 93 DEGREES
T OFFSET: 459 MS
VENTRICULAR RATE: 74 BPM

## 2024-09-27 LAB
BACTERIA BLD CULT: NORMAL
BACTERIA BLD CULT: NORMAL

## 2024-10-01 NOTE — ED PROCEDURE NOTE
Procedure  Critical Care    Performed by: Elyse H Klerman, MD  Authorized by: Elyse H Klerman, MD    Critical care provider statement:     Critical care time (minutes):  45    Critical care time was exclusive of:  Separately billable procedures and treating other patients and teaching time    Critical care was necessary to treat or prevent imminent or life-threatening deterioration of the following conditions:  Respiratory failure and toxidrome    Critical care was time spent personally by me on the following activities:  Blood draw for specimens, development of treatment plan with patient or surrogate, discussions with primary provider, evaluation of patient's response to treatment, examination of patient, obtaining history from patient or surrogate, review of old charts, re-evaluation of patient's condition, pulse oximetry, ordering and review of radiographic studies, ordering and review of laboratory studies and ordering and performing treatments and interventions    Care discussed with: admitting provider    Comments:      Acute resp failure with hypoxia and hypercapnia requiring bipap, altered mental status with suspected toxidrome (drug use)               Elyse H Klerman, MD  10/01/24 7261

## 2024-10-15 NOTE — PROGRESS NOTES
9/22/2024 ED note from 9/22/2024:  Guillermina Bonilla is a 63 y.o. female presenting to the ED as a Limited Trauma Activation after fall with AMS.  She was noted to have had a fall yesterday after smoking crack at her nursing facility.       History of Present Complaint:  The patient was referred to us by Referring Provider: Unknown referral. this is 64 y.o.  female with a past history of morbid obesity BMI 52, YADIRA, COPD on 4 L O2, hypertension, diabetes type 2 presenting with chronic lower back pain with sometimes radiation to her legs.  The patient had this pain for many many years and she had multiple pain providers that they gave her injections in addition to pain medication with limited response.  The patient currently lives in the nursing home.  She is principally confined to wheelchair very hard for her to walk by herself.  She has no incontinence no paralysis no saddle paresthesias.  Patient denies any red flags at this time.           Procedures:   Multiple injection from Dr. Mcclain and different other pain providers with minimal response through the years       Portions of record reviewed for pertinent issues: active problem list, medication list, allergies, family history, social history, notes from last encounter, encounters, lab results, imaging and other available records.    I have personally reviewed the OARRS report for this patient. This report is scanned into the electronic medical record. I have considered the risks of abuse, dependence, addiction and diversion. It showed: Percocet 5 mg from Jessica Escobar   OPIOID RISK ASSESSMENT SCORE high risk/26 avoid chronic opioid therapy  Aberrant behavior: None      Diagnostic studies:  9/22/2024 CT lumbar spine report showed degenerative changes with significant endplate degeneration at L2-3 with enhanced bone reaction the same with the else 1      Employment/disability/litigation: Retired     Social History:  with 2 children and 2  grandchildren, finished college majoring in arts      Review of Systems   HENT: Negative.     Eyes: Negative.    Respiratory: Negative.     Cardiovascular: Negative.    Gastrointestinal: Negative.    Endocrine: Negative.    Genitourinary: Negative.    Musculoskeletal:  Positive for arthralgias, back pain and gait problem.   Skin: Negative.    Hematological: Negative.    Psychiatric/Behavioral: Negative.            Physical Exam  Vitals and nursing note reviewed.   Constitutional:       Appearance: Normal appearance.   HENT:      Head: Normocephalic and atraumatic.      Nose: Nose normal.   Eyes:      Extraocular Movements: Extraocular movements intact.      Conjunctiva/sclera: Conjunctivae normal.      Pupils: Pupils are equal, round, and reactive to light.   Cardiovascular:      Rate and Rhythm: Normal rate and regular rhythm.      Pulses: Normal pulses.      Heart sounds: Normal heart sounds.   Pulmonary:      Effort: Pulmonary effort is normal.      Breath sounds: Normal breath sounds.   Abdominal:      General: Abdomen is flat. Bowel sounds are normal.      Palpations: Abdomen is soft.   Musculoskeletal:         General: Tenderness present.      Comments: Very deconditioned with a huge central obesity with pannus reaching her knees in wheelchair   Skin:     General: Skin is warm.   Neurological:      General: No focal deficit present.      Mental Status: She is alert and oriented to person, place, and time.   Psychiatric:         Mood and Affect: Mood normal.         Behavior: Behavior normal.            Assessment  64 years old morbidly obese with BMI 52 severely deconditioned with lower back pain chronic sometime with the right leg radiation.  She has been on multiple treatment through the years from different pain management doctors.  She was on opioid therapy in addition to injections and she stated the injection did not help her.  The patient is very high risk for chronic opioid therapy because of her drug  addiction in addition to morbid obesity.  The patient has normal GFR.  Will start her on Celebrex 200 mg twice daily for and tizanidine 4 mg twice a day and cyclobenzaprine 10 mg at night.  The patient is not interested in injections at this time or physical therapy or chiropractic care.       Plan  At least 50% of the visit was involved in the discussion of the options for treatment. We discussed exercises, medication, interventional therapies and surgery. Healthy life style is essential with patient hard work to achieve the wellness. In addition; discussion with the patient and/or family about any of the diagnostic results, impressions and/or recommended diagnostic studies, prognosis, risks and benefits of treatment options, instructions for treatment and/or follow-up, importance of compliance with chosen treatment options, risk-factor reduction, and patient/family education.         Recommended Pool therapy, walking in the pool, at least 3x per week for 30 minutes  Recommend self-directed physical therapy with at least daily exercises for minimum of 20-minute, brochure was handed to the patient  Celebrex 200 mg twice daily   Tizanidine 4 mg in the morning and 4 mg in the afternoon  Cyclobenzaprine 10 mg at bedtime  Healthy lifestyle and anti-inflammatory diet in addition to weight control discussed with the patient  Alternative chronic pain therapies was discussed, encouraged and information was handed  No need to follow-up with pain management medication could be adjusted by the nursing home physician       *Please note this report has been produced using speech recognition software and may contain errors related to that system including grammar, punctuation and spelling as well as words and phrases that may be inappropriate. If there are questions or concerns, please feel free to contact me to clarify.    Joyce Brink MD

## 2024-10-22 NOTE — PROGRESS NOTES
No chief complaint on file.    History of Present Complaint:  The patient was referred to us by Referring Provider: Emergency room. this is 64 y.o.  female  with a past history of morbid obesity BMI 52, YADIRA, COPD on 4 L O2, hypertension, diabetes type 2  presenting with back pain and a lump on her head on 10/1/2024.     Pain started 1997 patient has been to pain management doctors.  Many epidural, pain medication.    Pain is better nothing .  Pain is worst movement .     The pain is described as  burning shooting pins and needle  and is relieved by Sitting.      Prior Pain Therapies: Prior pain physician  , chronic opioid therapy  , and Injections      Past surgical history:  Cervical fusion [facility] .      Employment/disability/litigation: retired    Social history: , Has 2children, 2grandchildren and 0great-grandchildren, Finished high school, and Finished college major in artists     Diagnostic studies: CT scan films, x-rays done at [facility]    Opioid Risk Assessment Score 5/26    Caesar Each Box that Applies Female Male   FAMILY HISTORY OF SUBSTANCE ABUSE  Caesar the boxes that applies   Alcohol x 1    ? 3   Illegal drugs ?  2 ? 3   Rx drugs ?  4 ? 4   PERSONAL HISTORY OF SUBSTNACE ABUSE   Alcohol ?  3 ?  3   Illegal drugs ?  4 ?  4    Rx drugs ?  5 ?  5   Age Between 16-45 years ?  1 ?  1   History of Preadolescent Sexual Abuse x 3 ?  0   PSYCHOLOGIC DISEASE   ADD, OCD, bipolar, schizophrenia   ?  2 ?  2   Depression x  1 ?  1   Scoring Totals 5      Scoring (Risk)  0-3 - Low  4-7 - Moderate  8 - High  Opioid Risk Assessment Score 5/26

## 2024-10-23 ENCOUNTER — OFFICE VISIT (OUTPATIENT)
Dept: PAIN MEDICINE | Facility: CLINIC | Age: 64
End: 2024-10-23
Payer: MEDICARE

## 2024-10-23 VITALS
HEART RATE: 77 BPM | SYSTOLIC BLOOD PRESSURE: 115 MMHG | TEMPERATURE: 97.3 F | RESPIRATION RATE: 18 BRPM | BODY MASS INDEX: 50.02 KG/M2 | DIASTOLIC BLOOD PRESSURE: 77 MMHG | HEIGHT: 64 IN | WEIGHT: 293 LBS | OXYGEN SATURATION: 91 %

## 2024-10-23 DIAGNOSIS — M43.06 LUMBAR SPONDYLOLYSIS: Primary | ICD-10-CM

## 2024-10-23 PROCEDURE — 1036F TOBACCO NON-USER: CPT | Performed by: ANESTHESIOLOGY

## 2024-10-23 PROCEDURE — 99214 OFFICE O/P EST MOD 30 MIN: CPT | Performed by: ANESTHESIOLOGY

## 2024-10-23 PROCEDURE — 99204 OFFICE O/P NEW MOD 45 MIN: CPT | Performed by: ANESTHESIOLOGY

## 2024-10-23 PROCEDURE — 3008F BODY MASS INDEX DOCD: CPT | Performed by: ANESTHESIOLOGY

## 2024-10-23 PROCEDURE — 1111F DSCHRG MED/CURRENT MED MERGE: CPT | Performed by: ANESTHESIOLOGY

## 2024-10-23 PROCEDURE — 1125F AMNT PAIN NOTED PAIN PRSNT: CPT | Performed by: ANESTHESIOLOGY

## 2024-10-23 PROCEDURE — 1159F MED LIST DOCD IN RCRD: CPT | Performed by: ANESTHESIOLOGY

## 2024-10-23 RX ORDER — TIZANIDINE 4 MG/1
TABLET ORAL
Qty: 60 TABLET | Refills: 11 | Status: SHIPPED | OUTPATIENT
Start: 2024-10-23

## 2024-10-23 RX ORDER — CELECOXIB 200 MG/1
200 CAPSULE ORAL 2 TIMES DAILY
Qty: 60 CAPSULE | Refills: 11 | Status: SHIPPED | OUTPATIENT
Start: 2024-10-23 | End: 2025-10-18

## 2024-10-23 RX ORDER — CYCLOBENZAPRINE HCL 10 MG
10 TABLET ORAL NIGHTLY
Qty: 90 TABLET | Refills: 3 | Status: SHIPPED | OUTPATIENT
Start: 2024-10-23 | End: 2025-10-18

## 2024-10-23 SDOH — ECONOMIC STABILITY: FOOD INSECURITY: WITHIN THE PAST 12 MONTHS, YOU WORRIED THAT YOUR FOOD WOULD RUN OUT BEFORE YOU GOT MONEY TO BUY MORE.: NEVER TRUE

## 2024-10-23 SDOH — ECONOMIC STABILITY: FOOD INSECURITY: WITHIN THE PAST 12 MONTHS, THE FOOD YOU BOUGHT JUST DIDN'T LAST AND YOU DIDN'T HAVE MONEY TO GET MORE.: NEVER TRUE

## 2024-10-23 ASSESSMENT — LIFESTYLE VARIABLES
HAVE YOU OR SOMEONE ELSE BEEN INJURED AS A RESULT OF YOUR DRINKING: NO
SKIP TO QUESTIONS 9-10: 1
HOW OFTEN DO YOU HAVE A DRINK CONTAINING ALCOHOL: NEVER
HOW OFTEN DO YOU HAVE SIX OR MORE DRINKS ON ONE OCCASION: NEVER
AUDIT TOTAL SCORE: 0
HAS A RELATIVE, FRIEND, DOCTOR, OR ANOTHER HEALTH PROFESSIONAL EXPRESSED CONCERN ABOUT YOUR DRINKING OR SUGGESTED YOU CUT DOWN: NO
TOTAL SCORE: 5
HOW MANY STANDARD DRINKS CONTAINING ALCOHOL DO YOU HAVE ON A TYPICAL DAY: PATIENT DOES NOT DRINK
AUDIT-C TOTAL SCORE: 0

## 2024-10-23 ASSESSMENT — ENCOUNTER SYMPTOMS
RESPIRATORY NEGATIVE: 1
DEPRESSION: 1
ARTHRALGIAS: 1
OCCASIONAL FEELINGS OF UNSTEADINESS: 1
PSYCHIATRIC NEGATIVE: 1
CARDIOVASCULAR NEGATIVE: 1
LOSS OF SENSATION IN FEET: 1
EYES NEGATIVE: 1
BACK PAIN: 1
ENDOCRINE NEGATIVE: 1
GASTROINTESTINAL NEGATIVE: 1
HEMATOLOGIC/LYMPHATIC NEGATIVE: 1

## 2024-10-23 ASSESSMENT — COLUMBIA-SUICIDE SEVERITY RATING SCALE - C-SSRS
1. IN THE PAST MONTH, HAVE YOU WISHED YOU WERE DEAD OR WISHED YOU COULD GO TO SLEEP AND NOT WAKE UP?: NO
2. HAVE YOU ACTUALLY HAD ANY THOUGHTS OF KILLING YOURSELF?: NO
6. HAVE YOU EVER DONE ANYTHING, STARTED TO DO ANYTHING, OR PREPARED TO DO ANYTHING TO END YOUR LIFE?: NO

## 2024-10-23 ASSESSMENT — PATIENT HEALTH QUESTIONNAIRE - PHQ9
1. LITTLE INTEREST OR PLEASURE IN DOING THINGS: NOT AT ALL
2. FEELING DOWN, DEPRESSED OR HOPELESS: NOT AT ALL
SUM OF ALL RESPONSES TO PHQ9 QUESTIONS 1 AND 2: 0

## 2024-10-23 ASSESSMENT — PAIN - FUNCTIONAL ASSESSMENT: PAIN_FUNCTIONAL_ASSESSMENT: 0-10

## 2024-10-23 ASSESSMENT — PAIN SCALES - GENERAL
PAINLEVEL_OUTOF10: 9
PAINLEVEL_OUTOF10: 9

## 2025-01-16 NOTE — PROGRESS NOTES
This is 64 y.o.  female with who has been treated for Lumbar spondylolysis here for 3-month follow-up. Pain is unchanged, The pain is described as sharp, stabbing, and hot pins-and-needles  and is relieved by nothing with who is here for follow-up   Chief Complaint   Patient presents with    Follow-up       Pain Therapies: Recommended Pool therapy, walking in the pool, at least 3x per week for 30 minutes No   Recommend self-directed physical therapy with at least daily exercises for minimum of 20-minute, brochure was handed to the patient starting Friday   Celebrex 200 mg twice daily  No change in pain   Tizanidine 4 mg in the morning and 4 mg in the afternoon  No change in pain   Cyclobenzaprine 10 mg at bedtime  No change in pain   Healthy lifestyle and anti-inflammatory diet in addition to weight control discussed with the patient  Alternative chronic pain therapies was discussed, encouraged and information was handed  No need to follow-up with pain management medication could be adjusted by the nursing home physician     Patient states that she has a new pain , in her left leg has a new wound and she has increased pain sharp and burning . Started 3 weeks ago .

## 2025-01-19 NOTE — PROGRESS NOTES
SUBJECTIVE:  This is 64 y.o.  female with PMH of morbid obesity BMI 52, YADIRA, COPD on 4 L O2, hypertension, diabetes type 2, crack cocaine user, with lower back pain sometimes with radiation to the leg on Percocet 5 mg twice daily from Jessica Escobar  who is here for follow-up who is back here complaining of her back pain and ulcer on her left leg that is burning a lot and she wants pain medication and then I told her that she is getting pain medication from Jessica Escobar she was surprised and she said okay I will go to the nursing home and I will get my medication then.  I told her that she really has to take the medication from her nursing home physicians and I cannot prescribe any opioid therapy for her.  They can adjust her gabapentin to affect as well    Prior office visit:  10/23/2024: 9/22/2024 ED note from 9/22/2024:  Guillermina Bonilla is a 63 y.o. female presenting to the ED as a Limited Trauma Activation after fall with AMS.  She was noted to have had a fall yesterday after smoking crack at her nursing facility.         History of Present Complaint:  The patient was referred to us by Referring Provider: Unknown referral. this is 64 y.o.  female with a past history of morbid obesity BMI 52, YADIRA, COPD on 4 L O2, hypertension, diabetes type 2 presenting with chronic lower back pain with sometimes radiation to her legs.  The patient had this pain for many many years and she had multiple pain providers that they gave her injections in addition to pain medication with limited response.  The patient currently lives in the nursing home.  She is principally confined to wheelchair very hard for her to walk by herself.  She has no incontinence no paralysis no saddle paresthesias.  Patient denies any red flags at this time.              Procedures:   Multiple injection from Dr. Mcclain and different other pain providers with minimal response through the years         Portions of record reviewed for pertinent issues:  active problem list, medication list, allergies, family history, social history, notes from last encounter, encounters, lab results, imaging and other available records.     I have personally reviewed the OARRS report for this patient. This report is scanned into the electronic medical record. I have considered the risks of abuse, dependence, addiction and diversion. It showed: Percocet 5 mg from Jessica Rossos   OPIOID RISK ASSESSMENT SCORE high risk/26 avoid chronic opioid therapy  Aberrant behavior: None        Diagnostic studies:  9/22/2024 CT lumbar spine report showed degenerative changes with significant endplate degeneration at L2-3 with enhanced bone reaction the same with the else 1       Employment/disability/litigation: Retired      Social History:  with 2 children and 2 grandchildren, finished college majoring in arts        Review of Systems   HENT: Negative.     Eyes: Negative.    Respiratory: Negative.     Cardiovascular: Negative.    Gastrointestinal: Negative.    Endocrine: Negative.    Genitourinary: Negative.    Musculoskeletal:  Positive for arthralgias, back pain and gait problem.   Skin: Negative.    Hematological: Negative.    Psychiatric/Behavioral: Negative.              Physical Exam  Vitals and nursing note reviewed.   Constitutional:       Appearance: Normal appearance.   HENT:      Head: Normocephalic and atraumatic.      Nose: Nose normal.   Eyes:      Extraocular Movements: Extraocular movements intact.      Conjunctiva/sclera: Conjunctivae normal.      Pupils: Pupils are equal, round, and reactive to light.   Cardiovascular:      Rate and Rhythm: Normal rate and regular rhythm.      Pulses: Normal pulses.      Heart sounds: Normal heart sounds.   Pulmonary:      Effort: Pulmonary effort is normal.      Breath sounds: Normal breath sounds.   Abdominal:      General: Abdomen is flat. Bowel sounds are normal.      Palpations: Abdomen is soft.   Musculoskeletal:          General: Tenderness present.      Comments: Very deconditioned with a huge central obesity with pannus reaching her knees in wheelchair   Skin:     General: Skin is warm.   Neurological:      General: No focal deficit present.      Mental Status: She is alert and oriented to person, place, and time.   Psychiatric:         Mood and Affect: Mood normal.         Behavior: Behavior normal.            Plan  At least 50% of the visit was involved in the discussion of the options for treatment. We discussed exercises, medication, interventional therapies and surgery. Healthy life style is essential with patient hard work to achieve the wellness. In addition; discussion with the patient and/or family about any of the diagnostic results, impressions and/or recommended diagnostic studies, prognosis, risks and benefits of treatment options, instructions for treatment and/or follow-up, importance of compliance with chosen treatment options, risk-factor reduction, and patient/family education.         Recommended Pool therapy, walking in the pool, at least 3x per week for 30 minutes  Continue self-directed physical therapy with at least daily exercises for minimum of 20-minute, brochure was handed to the patient  Patient is very deconditioned and she needs significant physical therapy  No opioid therapy from this clinic all her medication should be through her nursing home  No interventional therapies are recommended  Healthy lifestyle and anti-inflammatory diet in addition to weight control discussed with the patient  Alternative chronic pain therapies was discussed, encouraged and information was handed  No need to follow-up with pain management at [facility]     *Please note this report has been produced using speech recognition software and may contain errors related to that system including grammar, punctuation and spelling as well as words and phrases that may be inappropriate. If there are questions or concerns, please  feel free to contact me to clarify.    Joyce Brink MD

## 2025-01-22 ENCOUNTER — OFFICE VISIT (OUTPATIENT)
Dept: PAIN MEDICINE | Facility: CLINIC | Age: 65
End: 2025-01-22
Payer: COMMERCIAL

## 2025-01-22 VITALS
SYSTOLIC BLOOD PRESSURE: 97 MMHG | HEART RATE: 66 BPM | HEIGHT: 63 IN | WEIGHT: 293 LBS | TEMPERATURE: 98.5 F | RESPIRATION RATE: 18 BRPM | OXYGEN SATURATION: 94 % | BODY MASS INDEX: 51.91 KG/M2 | DIASTOLIC BLOOD PRESSURE: 57 MMHG

## 2025-01-22 DIAGNOSIS — G89.4 CHRONIC PAIN SYNDROME: Primary | ICD-10-CM

## 2025-01-22 PROCEDURE — 3008F BODY MASS INDEX DOCD: CPT | Performed by: ANESTHESIOLOGY

## 2025-01-22 PROCEDURE — 99214 OFFICE O/P EST MOD 30 MIN: CPT | Performed by: ANESTHESIOLOGY

## 2025-01-22 PROCEDURE — 1125F AMNT PAIN NOTED PAIN PRSNT: CPT | Performed by: ANESTHESIOLOGY

## 2025-01-22 PROCEDURE — 1036F TOBACCO NON-USER: CPT | Performed by: ANESTHESIOLOGY

## 2025-01-22 ASSESSMENT — PAIN DESCRIPTION - DESCRIPTORS: DESCRIPTORS: SHARP;BURNING

## 2025-01-22 ASSESSMENT — ENCOUNTER SYMPTOMS
OCCASIONAL FEELINGS OF UNSTEADINESS: 1
DEPRESSION: 1
LOSS OF SENSATION IN FEET: 1

## 2025-01-22 ASSESSMENT — PAIN - FUNCTIONAL ASSESSMENT: PAIN_FUNCTIONAL_ASSESSMENT: 0-10

## 2025-01-22 ASSESSMENT — PAIN SCALES - GENERAL
PAINLEVEL_OUTOF10: 9
PAINLEVEL_OUTOF10: 9

## 2025-02-12 ENCOUNTER — APPOINTMENT (OUTPATIENT)
Dept: ENDOCRINOLOGY | Facility: CLINIC | Age: 65
End: 2025-02-12
Payer: MEDICARE

## 2025-02-12 VITALS
BODY MASS INDEX: 50.02 KG/M2 | TEMPERATURE: 97.1 F | SYSTOLIC BLOOD PRESSURE: 119 MMHG | DIASTOLIC BLOOD PRESSURE: 64 MMHG | WEIGHT: 293 LBS | HEART RATE: 56 BPM | HEIGHT: 64 IN

## 2025-02-12 DIAGNOSIS — E66.01 MORBID OBESITY WITH BMI OF 50.0-59.9, ADULT (MULTI): ICD-10-CM

## 2025-02-12 DIAGNOSIS — I50.9 CONGESTIVE HEART FAILURE, UNSPECIFIED HF CHRONICITY, UNSPECIFIED HEART FAILURE TYPE: ICD-10-CM

## 2025-02-12 DIAGNOSIS — E11.65 TYPE 2 DIABETES MELLITUS WITH HYPERGLYCEMIA, WITH LONG-TERM CURRENT USE OF INSULIN: Primary | ICD-10-CM

## 2025-02-12 DIAGNOSIS — Z79.4 TYPE 2 DIABETES MELLITUS WITH HYPERGLYCEMIA, WITH LONG-TERM CURRENT USE OF INSULIN: Primary | ICD-10-CM

## 2025-02-12 PROCEDURE — 1160F RVW MEDS BY RX/DR IN RCRD: CPT | Performed by: NURSE PRACTITIONER

## 2025-02-12 PROCEDURE — 99205 OFFICE O/P NEW HI 60 MIN: CPT | Performed by: NURSE PRACTITIONER

## 2025-02-12 PROCEDURE — 3074F SYST BP LT 130 MM HG: CPT | Performed by: NURSE PRACTITIONER

## 2025-02-12 PROCEDURE — 3078F DIAST BP <80 MM HG: CPT | Performed by: NURSE PRACTITIONER

## 2025-02-12 PROCEDURE — 1036F TOBACCO NON-USER: CPT | Performed by: NURSE PRACTITIONER

## 2025-02-12 PROCEDURE — G2211 COMPLEX E/M VISIT ADD ON: HCPCS | Performed by: NURSE PRACTITIONER

## 2025-02-12 PROCEDURE — 1159F MED LIST DOCD IN RCRD: CPT | Performed by: NURSE PRACTITIONER

## 2025-02-12 PROCEDURE — 3008F BODY MASS INDEX DOCD: CPT | Performed by: NURSE PRACTITIONER

## 2025-02-12 RX ORDER — SEMAGLUTIDE 1.34 MG/ML
0.5 INJECTION, SOLUTION SUBCUTANEOUS
Qty: 9 ML | Refills: 3 | Status: SHIPPED | OUTPATIENT
Start: 2025-02-16

## 2025-02-12 ASSESSMENT — ENCOUNTER SYMPTOMS
LOSS OF SENSATION IN FEET: 0
OCCASIONAL FEELINGS OF UNSTEADINESS: 1

## 2025-02-12 ASSESSMENT — PATIENT HEALTH QUESTIONNAIRE - PHQ9
2. FEELING DOWN, DEPRESSED OR HOPELESS: NOT AT ALL
1. LITTLE INTEREST OR PLEASURE IN DOING THINGS: NOT AT ALL
SUM OF ALL RESPONSES TO PHQ9 QUESTIONS 1 AND 2: 0

## 2025-02-12 NOTE — PROGRESS NOTES
Subjective   Guillermina Bonilla is a 64 y.o. female here today for a new patient visit regarding Type 2 diabetes. Initial diagnosis with diabetes was 20 years.  The patient has a family history in paternal aunt.    Known complications include: CHF, HTN, hyperlipidemia, diabetic polyneuopathy on gabapentin, morbid obesity   Personal history of COPD    Previously seeing Dr. Garcia for diabetes care.    A1c 8% today.  Previous A1c 9.6% in 9/2024.     Here today to establish care  She reports her nurses at the facility said she has to come here now   She wanted to see a physician today and prefers not to see a nurse practitioner   She reports sugars are high   She has a wound on left shin.  Nurses are doing dressing changes at nursing home  Previously saw wound clinic at [facility]       Current diabetes regimen is as follows:   Semglee 90 units once daily   Aspart 50 units with meals plus sliding scale  If blood sugar is < 150 add 0 units   If blood sugar is 150-200 add 2 units   If blood sugar is 201-250 add 4 units  If blood sugar is 251-300 add 6 units   If blood sugar is 301-350 add 8 units   If blood sugar is 351-400 add 10 units   If blood sugar is 401-450 add 12 units  If blood sugar is over 450, call the office        The facility is currently checking the blood glucose 4 times per day .  Fasting sugars: 232-373  Pre lunch: 146-235  Pre dinner: 221-337  Bedtime; 120-224      Hypoglycemia frequency: on occasion readings 56, 64, 72  Hypoglycemia awareness: yes     The patient comes into the office today with hyperglycemia.        ROS   General: no fever, chills   Skin: no rashes, pruritis or dry skin  Eyes: no blurred or double vision or eye pain  Cardiac: denies chest pain, heart palpitations or orthopnea  Pulmonary: denies wheezing, productive cough or exertional dyspnea  GI: denies nausea, vomiting, diarrhea or constipation  : reports urinary incontinence   Neuro: (+) painful neuropathy   Musc: denies history of  "upper or lower extremity weakness  Endocrine: see HPI  Hematology: Negative for anemia, easy bleeding and bruising.    Objective    Physical Exam  Blood pressure 119/64, pulse 56, temperature 36.2 °C (97.1 °F), temperature source Temporal, height 1.626 m (5' 4\"), weight 133 kg (293 lb 3.2 oz).  General: not in acute distress, cooperative   Respiratory: normal respiratory effort  Musculoskeletal: normal gait       Current Outpatient Medications:     acetaminophen (Tylenol) 325 mg tablet, Take 2 tablets (650 mg) by mouth every 6 hours if needed for mild pain (1 - 3)., Disp: , Rfl:     alum-mag hydroxide-simeth (Maalox MAX) 400-400-40 mg/5 mL suspension, Take 30 mL by mouth every 6 hours if needed for indigestion or heartburn., Disp: , Rfl:     atorvastatin (Lipitor) 40 mg tablet, Take 1 tablet (40 mg) by mouth once daily at bedtime., Disp: , Rfl:     bisacodyl (Dulcolax) 10 mg suppository, Insert 1 suppository (10 mg) into the rectum once daily as needed for constipation., Disp: , Rfl:     celecoxib (CeleBREX) 200 mg capsule, Take 1 capsule (200 mg) by mouth 2 times a day., Disp: 60 capsule, Rfl: 11    cholecalciferol (Vitamin D-3) 25 MCG (1000 UT) tablet, Take 2 tablets (2,000 Units) by mouth once daily at bedtime., Disp: , Rfl:     cyanocobalamin (Vitamin B-12) 500 mcg tablet, Take 1 tablet (500 mcg) by mouth once daily at bedtime., Disp: , Rfl:     cyclobenzaprine (Flexeril) 10 mg tablet, Take 1 tablet (10 mg) by mouth once daily at bedtime., Disp: 90 tablet, Rfl: 3    DULoxetine (Cymbalta) 30 mg DR capsule, Take 1 capsule (30 mg) by mouth once daily. With 60mg capsule, Disp: , Rfl:     DULoxetine (Cymbalta) 60 mg DR capsule, Take 1 capsule (60 mg) by mouth once daily. With 30mg capsule, Disp: , Rfl:     enoxaparin (Lovenox) 60 mg/0.6 mL syringe, Inject 0.6 mL (60 mg) under the skin every 12 hours. (Patient not taking: Reported on 10/23/2024), Disp: , Rfl:     gabapentin (Neurontin) 300 mg capsule, Take 2 capsules " (600 mg) by mouth 3 times a day., Disp: , Rfl:     guaiFENesin (Mucinex) 600 mg 12 hr tablet, Take 1 tablet (600 mg) by mouth 2 times a day as needed for congestion. Do not crush, chew, or split., Disp: , Rfl:     HumaLOG KwikPen Insulin 100 unit/mL injection, Inject 50 Units under the skin 3 times a day. Plus sliding scale. Hold for BS<200, Disp: , Rfl:     ibuprofen 200 mg tablet, Take 3 tablets (600 mg) by mouth every 6 hours if needed for mild pain (1 - 3)., Disp: , Rfl:     insulin glargine-yfgn (Semglee,insulin glarg-yfgn,Pen) 100 unit/mL (3 mL) Pen, Inject 90 Units under the skin once daily at bedtime., Disp: , Rfl:     insulin lispro (HumaLOG KwikPen Insulin) 100 unit/mL injection, Inject 0-14 Units under the skin 3 times a day. Per sliding scale: <60 = notify MD, 151-200 = 2 units, 201-250 = 4 units, 251-300 = 6 units, 301-350 = 8 units, 351-400 = 10 units, 401-450 = 12 units, 451-500 = 14 units, >500 = notify MD, Disp: , Rfl:     ipratropium-albuteroL (Duo-Neb) 0.5-2.5 mg/3 mL nebulizer solution, Take 3 mL by nebulization every 2 hours if needed for wheezing., Disp: 180 mL, Rfl: 11    ipratropium-albuteroL (Duo-Neb) 0.5-2.5 mg/3 mL nebulizer solution, Take 3 mL by nebulization 3 times a day., Disp: , Rfl:     levoFLOXacin (Levaquin) 500 mg tablet, Take 1 tablet (500 mg) by mouth once daily., Disp: , Rfl:     lidocaine 4 % patch, Place 2 patches on the skin once daily as needed for mild pain (1 - 3). To bach and chest. Remove & discard patch within 12 hours or as directed by MD., Disp: , Rfl:     magnesium hydroxide (Milk of Magnesia) 400 mg/5 mL suspension, Take 30 mL by mouth once daily as needed for constipation., Disp: , Rfl:     melatonin 3 mg tablet, Take 1 tablet (3 mg) by mouth once daily at bedtime., Disp: , Rfl:     menthol 5.4 mg lozenge, Place 1 drop into mouth between cheek and gum every 4 hours if needed (cough)., Disp: , Rfl:     metoprolol tartrate (Lopressor) 25 mg tablet, Take 1 tablet (25  "mg) by mouth 2 times a day., Disp: , Rfl:     NIFEdipine ER (Adalat CC) 60 mg 24 hr tablet, Take 1 tablet (60 mg) by mouth once daily in the morning. Take before meals. Do not crush, chew, or split., Disp: , Rfl:     omeprazole (PriLOSEC) 20 mg DR capsule, Take 1 capsule (20 mg) by mouth 2 times a day., Disp: , Rfl:     ondansetron (Zofran) 4 mg tablet, Take 1 tablet (4 mg) by mouth every 12 hours if needed for nausea or vomiting., Disp: , Rfl:     oxybutynin XL (Ditropan-XL) 15 mg 24 hr tablet, Take 1 tablet (15 mg) by mouth once daily. Do not crush, chew, or split., Disp: , Rfl:     oxygen (O2) gas therapy, Inhale 4 L/min once every 24 hours., Disp: , Rfl:     polyethylene glycol (Miralax) 17 gram/dose powder, Mix 17 g of powder and drink once daily as needed (constipation)., Disp: , Rfl:     sodium phosphates (Fleet Enema) 19-7 gram/118 mL enema enema, Insert 118 mL into the rectum once daily as needed for constipation., Disp: , Rfl:     tiZANidine (Zanaflex) 2 mg tablet, Take 1 tablet (2 mg) by mouth every 8 hours if needed (back pain)., Disp: , Rfl:     tiZANidine (Zanaflex) 4 mg tablet, 1 p.o. in a.m. and 1 p.o. in the afternoon, Disp: 60 tablet, Rfl: 11    traZODone (Desyrel) 150 mg tablet, Take 1 tablet (150 mg) by mouth once daily at bedtime., Disp: , Rfl:     Lab Results   Component Value Date    BILITOT 0.4 09/24/2024    CALCIUM 8.9 09/24/2024    CO2 31 09/24/2024    CL 97 (L) 09/24/2024    CREATININE 0.86 09/24/2024    GLUCOSE 368 (H) 09/24/2024    ALKPHOS 63 09/24/2024    K 4.0 09/24/2024    PROT 6.0 (L) 09/24/2024     09/24/2024    AST 16 09/24/2024    ALT 19 09/24/2024    BUN 24 (H) 09/24/2024    ANIONGAP 12 09/24/2024    MG 2.03 09/23/2024    PHOS 2.9 09/23/2024    ALBUMIN 3.5 09/24/2024    GFRF >90 09/24/2023     No results found for: \"TRIG\", \"CHOL\", \"LDLCALC\", \"HDL\"  Lab Results   Component Value Date    HGBA1C 9.6 (H) 09/23/2024    HGBA1C 8.9 (H) 01/22/2024    HGBA1C 8.9 (A) 09/21/2023 "       The ASCVD Risk score (Corinne BOUDREAUX, et al., 2019) failed to calculate for the following reasons:    Risk score cannot be calculated because patient has a medical history suggesting prior/existing ASCVD    Assessment & Plan  Type 2 diabetes mellitus with hyperglycemia, with long-term current use of insulin    Orders:    semaglutide (Ozempic) 0.25 mg or 0.5 mg(2 mg/1.5 mL) pen injector; Inject 0.5 mg under the skin 1 (one) time per week.    Follow up in Endocrinology Pharmacy; Future    Congestive heart failure, unspecified HF chronicity, unspecified heart failure type         Morbid obesity with BMI of 50.0-59.9, adult (Multi)         BMI 50.0-59.9, adult (Multi)         Comment: A1c not at goal but improved. She is insulin resistant and today we discussed adding GLP1 to help with insulin resistance, weight loss.  Discussed side effects.  BMI also not at goal.  Would benefit from SGLT2 however she has urinary incontinence.  Would not be recommended at this time.  Today will not increase insulin given addition of GLP1.  Short term follow up with PharmD for additional adjustments.  She would prefer to see endocrinologist only not a nurse practitioner.        Plan:   Start ozempic 0.25 mg once weekly x 4 weeks   If tolerating after the 4 weeks, increase the ozempic 0.5 mg once weekly  Stop Semglee   Change to Tresiba U200 90 units once daily at bedtime    Change aspart 45 units with meals plus sliding scale if needed  If blood sugar is < 150 add 0 units   If blood sugar is 150-200 add 2 units   If blood sugar is 201-250 add 4 units  If blood sugar is 251-300 add 6 units   If blood sugar is 301-350 add 8 units   If blood sugar is 351-400 add 10 units   If blood sugar is 401-450 add 12 units  If blood sugar is over 450, call the office    Follow up with pharmacy in 1 month   Follow up with staff endocrinologist in 3-4 months

## 2025-02-12 NOTE — PATIENT INSTRUCTIONS
Start ozempic 0.25 mg once weekly x 4 weeks   If tolerating after the 4 weeks, increase the ozempic 0.5 mg once weekly    Stop Semglee     Change to Tresiba U200 90 units once daily at bedtime      Change aspart 45 units with meals plus sliding scale if needed  If blood sugar is < 150 add 0 units   If blood sugar is 150-200 add 2 units   If blood sugar is 201-250 add 4 units  If blood sugar is 251-300 add 6 units   If blood sugar is 301-350 add 8 units   If blood sugar is 351-400 add 10 units   If blood sugar is 401-450 add 12 units  If blood sugar is over 450, call the office      Follow up with pharmacy in 1 month     Follow up with staff endocrinologist in 3-4 months

## 2025-03-19 ENCOUNTER — APPOINTMENT (OUTPATIENT)
Dept: ENDOCRINOLOGY | Facility: CLINIC | Age: 65
End: 2025-03-19
Payer: COMMERCIAL

## 2025-03-25 ENCOUNTER — APPOINTMENT (OUTPATIENT)
Dept: RADIOLOGY | Facility: HOSPITAL | Age: 65
End: 2025-03-25
Payer: COMMERCIAL

## 2025-03-25 ENCOUNTER — APPOINTMENT (OUTPATIENT)
Dept: CARDIOLOGY | Facility: HOSPITAL | Age: 65
End: 2025-03-25
Payer: COMMERCIAL

## 2025-03-25 ENCOUNTER — HOSPITAL ENCOUNTER (EMERGENCY)
Facility: HOSPITAL | Age: 65
Discharge: HOME | End: 2025-03-26
Attending: EMERGENCY MEDICINE
Payer: COMMERCIAL

## 2025-03-25 DIAGNOSIS — L03.90 CHRONIC CELLULITIS: ICD-10-CM
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
## 2025-03-25 LAB — GLUCOSE BLD MANUAL STRIP-MCNC: 70 MG/DL (ref 74–99)

## 2025-03-25 PROCEDURE — 2500000005 HC RX 250 GENERAL PHARMACY W/O HCPCS

## 2025-03-25 PROCEDURE — 70450 CT HEAD/BRAIN W/O DYE: CPT | Performed by: RADIOLOGY

## 2025-03-25 PROCEDURE — 2500000001 HC RX 250 WO HCPCS SELF ADMINISTERED DRUGS (ALT 637 FOR MEDICARE OP)

## 2025-03-25 PROCEDURE — 73630 X-RAY EXAM OF FOOT: CPT | Mod: LEFT SIDE | Performed by: RADIOLOGY

## 2025-03-25 PROCEDURE — 73564 X-RAY EXAM KNEE 4 OR MORE: CPT | Mod: LEFT SIDE | Performed by: RADIOLOGY

## 2025-03-25 PROCEDURE — 72125 CT NECK SPINE W/O DYE: CPT

## 2025-03-25 PROCEDURE — 73610 X-RAY EXAM OF ANKLE: CPT | Mod: LT

## 2025-03-25 PROCEDURE — 93005 ELECTROCARDIOGRAM TRACING: CPT

## 2025-03-25 PROCEDURE — 99285 EMERGENCY DEPT VISIT HI MDM: CPT | Mod: 25

## 2025-03-25 PROCEDURE — 73590 X-RAY EXAM OF LOWER LEG: CPT | Mod: LT

## 2025-03-25 PROCEDURE — 73610 X-RAY EXAM OF ANKLE: CPT | Mod: LEFT SIDE | Performed by: RADIOLOGY

## 2025-03-25 PROCEDURE — 2500000002 HC RX 250 W HCPCS SELF ADMINISTERED DRUGS (ALT 637 FOR MEDICARE OP, ALT 636 FOR OP/ED): Mod: MUE

## 2025-03-25 PROCEDURE — 72125 CT NECK SPINE W/O DYE: CPT | Performed by: RADIOLOGY

## 2025-03-25 PROCEDURE — 73630 X-RAY EXAM OF FOOT: CPT | Mod: LT

## 2025-03-25 PROCEDURE — 82947 ASSAY GLUCOSE BLOOD QUANT: CPT

## 2025-03-25 PROCEDURE — 73564 X-RAY EXAM KNEE 4 OR MORE: CPT | Mod: LT

## 2025-03-25 PROCEDURE — 70450 CT HEAD/BRAIN W/O DYE: CPT

## 2025-03-25 PROCEDURE — 73590 X-RAY EXAM OF LOWER LEG: CPT | Mod: LEFT SIDE | Performed by: RADIOLOGY

## 2025-03-25 PROCEDURE — 94640 AIRWAY INHALATION TREATMENT: CPT

## 2025-03-25 PROCEDURE — 73030 X-RAY EXAM OF SHOULDER: CPT | Mod: RT

## 2025-03-25 PROCEDURE — 73030 X-RAY EXAM OF SHOULDER: CPT | Mod: RIGHT SIDE | Performed by: RADIOLOGY

## 2025-03-25 RX ORDER — ACETAMINOPHEN 325 MG/1
975 TABLET ORAL ONCE
Status: COMPLETED | OUTPATIENT
Start: 2025-03-25 | End: 2025-03-25

## 2025-03-25 RX ORDER — IPRATROPIUM BROMIDE AND ALBUTEROL SULFATE 2.5; .5 MG/3ML; MG/3ML
3 SOLUTION RESPIRATORY (INHALATION) ONCE
Status: COMPLETED | OUTPATIENT
Start: 2025-03-25 | End: 2025-03-25

## 2025-03-25 RX ADMIN — IPRATROPIUM BROMIDE AND ALBUTEROL SULFATE 3 ML: .5; 3 SOLUTION RESPIRATORY (INHALATION) at 21:11

## 2025-03-25 RX ADMIN — Medication 4 L/MIN: at 21:14

## 2025-03-25 RX ADMIN — ACETAMINOPHEN 975 MG: 325 TABLET, FILM COATED ORAL at 22:41

## 2025-03-25 ASSESSMENT — LIFESTYLE VARIABLES
TOTAL SCORE: 0
HAVE PEOPLE ANNOYED YOU BY CRITICIZING YOUR DRINKING: NO
EVER FELT BAD OR GUILTY ABOUT YOUR DRINKING: NO
HAVE YOU EVER FELT YOU SHOULD CUT DOWN ON YOUR DRINKING: NO
EVER HAD A DRINK FIRST THING IN THE MORNING TO STEADY YOUR NERVES TO GET RID OF A HANGOVER: NO

## 2025-03-25 ASSESSMENT — PAIN SCALES - GENERAL: PAINLEVEL_OUTOF10: 5 - MODERATE PAIN

## 2025-03-25 ASSESSMENT — COLUMBIA-SUICIDE SEVERITY RATING SCALE - C-SSRS
1. IN THE PAST MONTH, HAVE YOU WISHED YOU WERE DEAD OR WISHED YOU COULD GO TO SLEEP AND NOT WAKE UP?: NO
6. HAVE YOU EVER DONE ANYTHING, STARTED TO DO ANYTHING, OR PREPARED TO DO ANYTHING TO END YOUR LIFE?: NO
2. HAVE YOU ACTUALLY HAD ANY THOUGHTS OF KILLING YOURSELF?: NO

## 2025-03-26 VITALS
OXYGEN SATURATION: 100 % | RESPIRATION RATE: 18 BRPM | DIASTOLIC BLOOD PRESSURE: 63 MMHG | TEMPERATURE: 97 F | HEART RATE: 69 BPM | HEIGHT: 61 IN | BODY MASS INDEX: 52.87 KG/M2 | SYSTOLIC BLOOD PRESSURE: 140 MMHG | WEIGHT: 280 LBS

## 2025-03-26 LAB
ATRIAL RATE: 75 BPM
P AXIS: 66 DEGREES
PR INTERVAL: 184 MS
Q ONSET: 254 MS
QRS COUNT: 12 BEATS
QRS DURATION: 99 MS
QT INTERVAL: 392 MS
QTC CALCULATION(BAZETT): 438 MS
QTC FREDERICIA: 422 MS
R AXIS: -8 DEGREES
T AXIS: -52 DEGREES
T OFFSET: 450 MS
VENTRICULAR RATE: 75 BPM

## 2025-03-26 ASSESSMENT — PAIN SCALES - GENERAL: PAINLEVEL_OUTOF10: 0 - NO PAIN

## 2025-03-26 ASSESSMENT — PAIN - FUNCTIONAL ASSESSMENT: PAIN_FUNCTIONAL_ASSESSMENT: 0-10

## 2025-03-26 NOTE — DISCHARGE INSTRUCTIONS
You were seen in the emergency department for a fall and head injury. Scans show that you do not have any acute bleed or fractures. We recommend using Tylenol and ice packs for pain as needed.  Follow up with your primary care provider as soon as possible regarding your visit to the emergency department. If your pain worsens or you develop confusion, lethargy, weakness, or numbness, return to the ED for further evaluation.

## 2025-03-26 NOTE — ED PROVIDER NOTES
HPI   Chief Complaint   Patient presents with    Fall       Patient is a 64-year-old female with PMHx of CAD, CHF, COPD, T2DM, fibromyalgia, GERD, HTN, and BPD who presents on 3/25/2025 after a fall. Patient reports that she was sitting in her wheelchair and bent over to pick something up when her wheelchair leaned to the side, causing her to fall. She hit the back of her head and endorses pain over that area, as well as her neck and LLE. She denies taking blood thinners.      History provided by:  Patient          Patient History   Past Medical History:   Diagnosis Date    Anesthesia of skin     Numbness    Cervicalgia 2021    Cervical pain    CHF (congestive heart failure)     COPD (chronic obstructive pulmonary disease) (Multi)     Diabetes mellitus (Multi)     Difficulty in walking, not elsewhere classified     Difficulty walking    Hypertension     Other symptoms and signs involving the musculoskeletal system     Limb weakness    Pain in unspecified limb     Limb pain    Personal history of other mental and behavioral disorders     History of depression    Personal history of other specified conditions     History of fatigue    Personal history of other specified conditions     History of urinary frequency    Personal history of other specified conditions     History of pain when walking    Spinal stenosis, cervical region 2017    Cervical stenosis of spine    Spondylosis without myelopathy or radiculopathy, cervical region 2017    Spondylosis of cervical region without myelopathy or radiculopathy    Xerosis cutis     Dry skin     Past Surgical History:   Procedure Laterality Date    CERVICAL FUSION  2014    Cervical Vertebral Fusion     SECTION, CLASSIC  2014     Section    OTHER SURGICAL HISTORY  2020    Ankle surgery    OTHER SURGICAL HISTORY  2020    Coronary artery bypass graft     Family History   Problem Relation Name Age of Onset    DAVID disease  Mother      Coronary artery disease Father       Social History     Tobacco Use    Smoking status: Former     Types: Cigarettes     Passive exposure: Never    Smokeless tobacco: Never   Substance Use Topics    Alcohol use: Not Currently    Drug use: Never     Comment: denies       Physical Exam   ED Triage Vitals [03/25/25 1941]   Temperature Heart Rate Respirations BP   36.1 °C (97 °F) 74 16 112/70      Pulse Ox Temp src Heart Rate Source Patient Position   94 % -- -- --      BP Location FiO2 (%)     -- --       Physical Exam  Vitals and nursing note reviewed.   Constitutional:       General: She is not in acute distress.     Appearance: She is well-developed. She is morbidly obese.   HENT:      Head: Normocephalic and atraumatic.   Eyes:      Conjunctiva/sclera: Conjunctivae normal.   Cardiovascular:      Rate and Rhythm: Normal rate and regular rhythm.      Heart sounds: No murmur heard.  Pulmonary:      Effort: Pulmonary effort is normal. No respiratory distress.      Breath sounds: Examination of the right-upper field reveals wheezing. Examination of the left-upper field reveals wheezing. Examination of the right-middle field reveals wheezing. Examination of the left-middle field reveals wheezing. Wheezing present.   Abdominal:      General: Abdomen is protuberant.      Palpations: Abdomen is soft.      Tenderness: There is no abdominal tenderness.   Musculoskeletal:         General: No swelling.      Cervical back: Neck supple.      Comments: Scabbing wound over RLE, chronic wound over LLE covered in dressing.   Skin:     General: Skin is warm and dry.      Capillary Refill: Capillary refill takes less than 2 seconds.   Neurological:      Mental Status: She is alert.   Psychiatric:         Mood and Affect: Mood normal.           ED Course & MDM   ED Course as of 03/25/25 2213   Tue Mar 25, 2025   2009 Pt seen with resident - this is a 64yF who present with fall out of her wheelchair, including hitting her  face.  Pt denies a/c use or LOC.  Complains of pain to her R shoulder and LLE (where she has cellulitis, currently wrapped and under treatment). [EK]   2104 EKG obtained given shortness of breath.  EKG independently interpreted by me at 2103 showing normal sinus rhythm left axis deviation normal intervals QTc 438 no ST changes but + inferior T wave inversions no prior available for comparison [EK]   2210 975mg tylenol and wound dressing ordered. Patient was given apple juice for hypoglycemia. []   2212 One-time Duo-Neb given for patient's hx of COPD, nasal cannula 4L O2 started (baseline) []      ED Course User Index  [EK] Elyse H Klerman, MD  [] Arnel Estevez MD         Diagnoses as of 03/25/25 2213   Fall, initial encounter   Chronic cellulitis                 No data recorded     [place] Coma Scale Score: 15 (03/25/25 1941 : Gerardo Richter, EMT)                           Medical Decision Making  Patient presented on 3/25/2025 due to a fall and head injury after shifting her position in a wheelchair. CT head and XR of LLE did not show any fractures or acute intracranial processes. Patient was given Duo-neb as prescribed and her home dose of oxygen for COPD. Findings were discussed with patient who was agreeable to discharge back to facility. She was instructed to follow up with her PCP and return to the ED in the event of acute numbness, weakness, lethargy, confusion, or pain.    Amount and/or Complexity of Data Reviewed  Labs: ordered. Decision-making details documented in ED Course.  Radiology: ordered. Decision-making details documented in ED Course.        Procedure  Procedures     Arnel Estevez MD  Resident  03/25/25 2213

## 2025-04-14 NOTE — PROGRESS NOTES
Patient is sent at the request of Sarika Farooq, APR* for my opinion regarding diabetes.  My recommendations below will be communicated back to the requesting provider by way of shared medical record.    Recommendations:   Start Trulicity 0.75mg once weekly  Continue current insulin doses  Start James 3 plus sensor with reader  ________________________________________________________________________    Subjective   Past Medical History:  Patient Active Problem List   Diagnosis    Asthma    Carpal tunnel syndrome    Cervical pain    Cervical radiculopathy    Cervical stenosis of spine    Lumbar canal stenosis    Compression fracture of L4 vertebra with nonunion    Herniation of intervertebral disc of cervical region    Lumbar degenerative disc disease    Low back pain    Lumbago with sciatica    Lumbar arthropathy    Lumbosacral neuritis    Osteoporosis of vertebra    Spondylosis of cervical region without myelopathy or radiculopathy    Acute hypercapnic respiratory failure     HPI:  Guillermina Bonilla is a 64 y.o. female with a PMH significant for T2DM, CHF, HTN, HLD, and obesity  Pt presents today for new patient visit with endo PharmD for Type 2 Diabetes Mellitus  Last seen by Sarika Faroqo on 2/12/25 where Ozempic started at 0.25mg for 4 weeks then pt was to increase to 0.5mg once weekly, Semglee was changed to Tresiba, and prandial insulin decreased from 50 --> 45 units + SS#2  Pt missed 3/19 visit w/ PharmD    Today:   Of note, pt was late to visit which limited scope of visit  Pt resides at nursing facility where nurses administer all medications, medication list and blood sugar log brought to patient's appointment today  Upon reviewing medications:  Both Lyumjev (base) and Novolog (SS) listed on chart  Instead of Tresiba pt is receiving Toujeo  Ozempic was NOT listed --> pt reports she was told this was not covered by insurance  Pt reports variability in sugars during the day, sometimes having highs and  sometimes lows  Expressed interest in a CGM today, phone is not compatible with either Dexcom or James    Diabetes Pharmacotherapy:    Lyumjev 45 units + SS#2  Toujeo 90 units at bedtime  **Ozempic not on medication list, pt reports she was told it was not covered by insurance    HLD Pharmacotherapy:    Atorvastatin 40mg daily    Adherence: medications given by facility    Social:  Current diet: on average, 3 meals per day and evening snack from facility  Current exercise:  mobility limited, in wheelchair at time of visit      Allergies:  Diclofenac epolamine, Tetracycline, Macrobid [nitrofurantoin monohyd/m-cryst], Prochlorperazine, Adhesive tape-silicones, Amoxicillin-pot clavulanate, Azithromycin, Benzalkonium chloride, Erythromycin, Neomycin-bacitracin-polymyxin, and Nitro    Medication list:  Current Outpatient Medications   Medication Instructions    acetaminophen (TYLENOL) 650 mg, Every 6 hours PRN    alum-mag hydroxide-simeth (Maalox MAX) 400-400-40 mg/5 mL suspension 30 mL, Every 6 hours PRN    atorvastatin (LIPITOR) 40 mg, Nightly    bisacodyl (DULCOLAX) 10 mg, Daily PRN    celecoxib (CELEBREX) 200 mg, oral, 2 times daily    cholecalciferol (VITAMIN D-3) 2,000 Units, Nightly    cyanocobalamin (VITAMIN B-12) 500 mcg, Nightly    cyclobenzaprine (FLEXERIL) 10 mg, oral, Nightly    DULoxetine (CYMBALTA) 60 mg, Daily    DULoxetine (CYMBALTA) 30 mg, Daily    enoxaparin (LOVENOX) 60 mg, subcutaneous, Every 12 hours scheduled    gabapentin (NEURONTIN) 600 mg, 3 times daily    guaiFENesin (MUCINEX) 600 mg, oral, 2 times daily PRN, Do not crush, chew, or split.    HumaLOG KwikPen Insulin 50 Units, 3 times daily    ibuprofen 600 mg, Every 6 hours PRN    insulin glargine-yfgn (SEMGLEE(INSULIN GLARG-YFGN)PEN) 90 Units, Nightly    insulin lispro (HUMALOG KWIKPEN INSULIN) 0-14 Units, 3 times daily    ipratropium-albuteroL (Duo-Neb) 0.5-2.5 mg/3 mL nebulizer solution 3 mL, nebulization, Every 2 hour PRN     ipratropium-albuteroL (Duo-Neb) 0.5-2.5 mg/3 mL nebulizer solution 3 mL, nebulization, 3 times daily RT    levoFLOXacin (LEVAQUIN) 500 mg, oral, Daily    lidocaine 4 % patch 2 patches, Daily PRN    magnesium hydroxide (Milk of Magnesia) 400 mg/5 mL suspension 30 mL, Daily PRN    melatonin 3 mg tablet 1 tablet, Nightly    menthol 5.4 mg lozenge 1 drop, Every 4 hours PRN    metoprolol tartrate (Lopressor) 25 mg tablet 1 tablet, 2 times daily    NIFEdipine ER (ADALAT CC) 60 mg, oral, Daily before breakfast, Do not crush, chew, or split.    omeprazole (PriLOSEC) 20 mg DR capsule 1 capsule, 2 times daily    ondansetron (ZOFRAN) 4 mg, Every 12 hours PRN    oxybutynin XL (DITROPAN-XL) 15 mg, Daily    oxygen (O2) 4 L/min, inhalation, Every 24 hours    Ozempic 0.5 mg, subcutaneous, Once Weekly    polyethylene glycol (MIRALAX) 17 g, Daily PRN    sodium phosphates (Fleet Enema) 19-7 gram/118 mL enema enema 118 mL, Daily PRN    tiZANidine (Zanaflex) 4 mg tablet 1 p.o. in a.m. and 1 p.o. in the afternoon    tiZANidine (ZANAFLEX) 2 mg, Every 8 hours PRN    traZODone (DESYREL) 150 mg, Nightly        Objective   Last Recorded Vitals:  BP Readings from Last 3 Encounters:   03/26/25 140/63   02/12/25 119/64   01/22/25 97/57     Wt Readings from Last 3 Encounters:   03/25/25 127 kg (280 lb)   02/12/25 133 kg (293 lb 3.2 oz)   01/22/25 137 kg (303 lb)     BMI Readings from Last 1 Encounters:   03/25/25 52.91 kg/m²      Labs  A1C  Lab Results   Component Value Date    HGBA1C 9.6 (H) 09/23/2024    HGBA1C 8.9 (H) 01/22/2024    HGBA1C 8.9 (A) 09/21/2023   8% 2/12/25  BMP/LFTs  Lab Results   Component Value Date    CREATININE 0.86 09/24/2024    CREATININE 0.96 09/23/2024    CREATININE 1.08 (H) 09/22/2024    EGFR 76 09/24/2024    EGFR 66 09/23/2024    EGFR 57 (L) 09/22/2024    GLUCOSE 368 (H) 09/24/2024     09/24/2024    K 4.0 09/24/2024    CL 97 (L) 09/24/2024    CALCIUM 8.9 09/24/2024    CO2 31 09/24/2024    BUN 24 (H) 09/24/2024     "ALT 19 09/24/2024    AST 16 09/24/2024    ALKPHOS 63 09/24/2024    BILITOT 0.4 09/24/2024     Lipids  No results found for: \"TRIG\", \"CHOL\", \"LDLF\", \"LDLCALC\", \"LDLDIRECT\", \"HDL\"  Urine Albumin Creatinine Ratio  No results found for: \"MICROALBCREA\"  ASCVD risk  The ASCVD Risk score (Corinne BOUDREAUX, et al., 2019) failed to calculate for the following reasons:    Risk score cannot be calculated because patient has a medical history suggesting prior/existing ASCVD    Additional labs:  No results found for: \"FRUCTOSAMINE\", \"CPEPTIDE\", \"BAE51EQ\", \"NTIB\", \"ZNT8A\", \"INSAB\"    Home glucose monitoring:  Hypoglycemia:  Pt reports occasionally readings are low, lowest in the past week in the 80s. Treats with OJ if low, typically during the day and not overnight  Of note, pt reports some of these tests are AFTER meals instead of before  Date Morning Lunch Dinner Bedtime   16-Apr 305      15-Apr 295 196 195 357   14-Apr 274 290 175 328   13-Apr 397 206 222 88   12-Apr 250 251 129 222   11-Apr 225 204 82 223   10-Apr 161 451 225 99   9-Apr 337 214 165 226   8-Apr 223 223 110 168            254 163 214       Assessment/Plan   Type 2 Diabetes Mellitus  Goal A1C <7.5% (age, comorbidities), above goal as of 9/2024. SMBG shows hyperglycemia throughout the day with reports of occasional lows. Since last visit pt had not been able to start Ozempic as it appears it was not a part of her insurance formulary ([employer]). Discussion limited given pt was late to appt, however discussed starting Trulicity as this is covered under [employer], pt agreeable. GLP1 will help with wt loss and insulin resistance. Will avoid SGLT2i for now given h/o incontinence. Lastly, discussed CGM and pt was interested. Unfortunately her phone was not compatible with Dexcom or James, will order reader with sensors and bring back for download.  Plan:  Start Trulicity 0.75mg once weekly  Continue current insulin doses  Home glucose monitoring:   Start James 3 " plus sensor with reader  Education Provided to Patient:   Glycemic goals  MOA and potential ADRs of GLP1   Primary prevention:   Therapy: High intensity statin   LDL unavailable  Renal:  CKD: stage 2 - GFR 60-89  ACR: Unavailable  Renal protective agents: none, starting GLP1  DM medications are dosed appropriately for renal function  Labs: unable to discuss today given limited time, can discuss repeating A1C, ACR, and lipids at next appt  PharmD follow-up: 6 weeks  Endo follow-up: 7/3/25    Patient agreeable to plan as above, contact information provided for any future questions or concerns.    Stepan Nicholson, PharmD    Type of encounter: in person  Provider on site: Sarika Farooq    Continue all meds under the continuation of care with the referring provider and clinical pharmacy team.

## 2025-04-16 ENCOUNTER — APPOINTMENT (OUTPATIENT)
Dept: ENDOCRINOLOGY | Facility: CLINIC | Age: 65
End: 2025-04-16
Payer: COMMERCIAL

## 2025-04-16 ENCOUNTER — TELEPHONE (OUTPATIENT)
Dept: ENDOCRINOLOGY | Facility: CLINIC | Age: 65
End: 2025-04-16

## 2025-04-16 DIAGNOSIS — Z79.4 TYPE 2 DIABETES MELLITUS WITH HYPERGLYCEMIA, WITH LONG-TERM CURRENT USE OF INSULIN: ICD-10-CM

## 2025-04-16 DIAGNOSIS — E11.65 TYPE 2 DIABETES MELLITUS WITH HYPERGLYCEMIA, WITH LONG-TERM CURRENT USE OF INSULIN: ICD-10-CM

## 2025-04-16 PROCEDURE — 99211 OFF/OP EST MAY X REQ PHY/QHP: CPT

## 2025-04-16 RX ORDER — BLOOD-GLUCOSE SENSOR
EACH MISCELLANEOUS
Qty: 6 EACH | Refills: 3 | Status: SHIPPED | OUTPATIENT
Start: 2025-04-16

## 2025-04-16 RX ORDER — BLOOD-GLUCOSE,RECEIVER,CONT
EACH MISCELLANEOUS
Qty: 1 EACH | Refills: 0 | Status: SHIPPED | OUTPATIENT
Start: 2025-04-16 | End: 2025-04-16

## 2025-04-16 RX ORDER — BLOOD-GLUCOSE,RECEIVER,CONT
EACH MISCELLANEOUS
Qty: 1 EACH | Refills: 0 | Status: SHIPPED | OUTPATIENT
Start: 2025-04-16

## 2025-04-16 RX ORDER — BLOOD-GLUCOSE SENSOR
EACH MISCELLANEOUS
Qty: 6 EACH | Refills: 3 | Status: SHIPPED | OUTPATIENT
Start: 2025-04-16 | End: 2025-04-16

## 2025-04-16 NOTE — PATIENT INSTRUCTIONS
Start Trulicity 0.75mg once weekly  Continue current insulin doses  Start James 3 plus sensor with reader  Follow up with me in 6 weeks  If you have any questions or concerns, call me at: [phone]

## 2025-05-28 ENCOUNTER — APPOINTMENT (OUTPATIENT)
Dept: ENDOCRINOLOGY | Facility: CLINIC | Age: 65
End: 2025-05-28
Payer: COMMERCIAL

## 2025-07-03 ENCOUNTER — APPOINTMENT (OUTPATIENT)
Dept: ENDOCRINOLOGY | Facility: CLINIC | Age: 65
End: 2025-07-03
Payer: COMMERCIAL

## 2025-08-21 ENCOUNTER — NURSING HOME VISIT (OUTPATIENT)
Dept: POST ACUTE CARE | Facility: EXTERNAL LOCATION | Age: 65
End: 2025-08-21
Payer: COMMERCIAL

## 2025-08-21 DIAGNOSIS — M54.12 CERVICAL RADICULOPATHY: ICD-10-CM

## 2025-08-21 DIAGNOSIS — M50.20 HERNIATION OF INTERVERTEBRAL DISC OF CERVICAL REGION: ICD-10-CM

## 2025-08-21 DIAGNOSIS — M47.812 SPONDYLOSIS OF CERVICAL REGION WITHOUT MYELOPATHY OR RADICULOPATHY: ICD-10-CM

## 2025-08-21 DIAGNOSIS — S32.040K COMPRESSION FRACTURE OF L4 VERTEBRA WITH NONUNION: ICD-10-CM

## 2025-08-21 DIAGNOSIS — J96.02 ACUTE HYPERCAPNIC RESPIRATORY FAILURE: Primary | ICD-10-CM

## 2026-04-07 ENCOUNTER — APPOINTMENT (OUTPATIENT)
Dept: ENDOCRINOLOGY | Facility: CLINIC | Age: 66
End: 2026-04-07
Payer: COMMERCIAL